# Patient Record
Sex: MALE | Race: WHITE | NOT HISPANIC OR LATINO | Employment: FULL TIME | URBAN - METROPOLITAN AREA
[De-identification: names, ages, dates, MRNs, and addresses within clinical notes are randomized per-mention and may not be internally consistent; named-entity substitution may affect disease eponyms.]

---

## 2017-01-09 NOTE — PROGRESS NOTES
SUBJECTIVE:                                                    Sukhdeep Wolf is a 46 year old male who presents to clinic today for the following health issues:        Back Pain Follow Up-    The patient is here for a four-month follow-up. He is seen every 4 months to provide ongoing documentation for the need for limited activities at work. He has bilateral upper back overuse syndrome findings relating to a development of muscular pain if his hours and lifting limits are not monitored. He has no neuropathic changes. To date his limitations have been relatively well followed and he has minor to moderate discomfort at work and night. He is able to recover with 2 days of rest over the weekend.        Description:   Location of pain:  Center and right under the shoulder blades   Character of pain: dull ache and pulled muscle feeling   Pain radiation: shoulder pain radiates but back pain does not   Since last visit, pain is:  unchanged  New numbness or weakness in legs, not attributed to pain:  no     Intensity: At its worst 5/10    History:   Pain interferes with job: YES, is not working Saturdays so he has 2 full days to heal and rest  Therapies tried without relief: ibuprofen, Tylenol   Therapies tried with relief: heat        Accompanying Signs & Symptoms:  Risk of Fracture:  None  Risk of Cauda Equina:  None  Risk of Infection:  None  Risk of Cancer:  None           Amount of exercise or physical activity: occasionally     Problems taking medications regularly: No    Medication side effects: none    Diet: regular (no restrictions)      Chronic Pain Follow-Up       Type / Location of Pain:  Bilateral upper back myofascial pain due to overuse syndrome as well as rotator cuff pain in his right shoulder from a partial rotator cuff tear. Aggravation for these areas come from his repetitive physical demands at work. Uses Tramadol 50 mg up to 1 daily on work days. Usually much less use.  Analgesia/pain control:        Recent changes:  same      Overall control: Comfortably manageable  Activity level/function:      Daily activities:  Able to do all daily activities    Work:  Pain does not limit any  work  Adverse effects:  No  Adherance    Taking medication as directed?  Yes    Participating in other treatments: yes  Risk Factors:    Sleep:  Good    Mood/anxiety:  controlled    Recent family or social stressors:  none noted    Other aggravating factors: repetitive activities - Of pulling against resistance at work. Repetitive activities.  PHQ-9 SCORE 1/11/2017   Total Score 0     YOANA-7 SCORE 1/11/2017   Total Score 0     Encounter-Level CSA:     There are no encounter-level csa.             Problem list and histories reviewed & adjusted, as indicated.  Additional history:     Problem list, Medication list, Allergies, and Medical/Social/Surgical histories reviewed in EPIC and updated as appropriate.    ROS:  C: NEGATIVE for fever, chills, change in weight  I: NEGATIVE for worrisome rashes, moles or lesions  E: NEGATIVE for vision changes or irritation  E/M: NEGATIVE for ear, mouth and throat problems  R: NEGATIVE for significant cough or SOB  CV: NEGATIVE for chest pain, palpitations or peripheral edema  GI: NEGATIVE for nausea, abdominal pain, heartburn, or change in bowel habits  : NEGATIVE for frequency, dysuria, or hematuria  MUSCULOSKELETAL: As noted above regarding his upper back symptoms and right shoulder rotator cuff tear.  N: NEGATIVE for weakness, dizziness or paresthesias  E: NEGATIVE for temperature intolerance, skin/hair changes  H: NEGATIVE for bleeding problems  P: NEGATIVE for changes in mood or affect    OBJECTIVE:                                                    /78 mmHg  Pulse 78  Temp(Src) 98.4  F (36.9  C) (Temporal)  Resp 16  Ht   Wt 250 lb 6.4 oz (113.581 kg)  Body mass index is 34.21 kg/(m^2).  GENERAL: healthy, alert and no distress  EYES: Eyes grossly normal to inspection, extraocular  movements - intact, and PERRL  HENT: ear canals- normal; TMs- normal; Nose- normal; Mouth- no ulcers, no lesions  NECK: no tenderness, no adenopathy, no asymmetry, no masses, no stiffness; thyroid- normal to palpation  RESP: lungs clear to auscultation - no rales, no rhonchi, no wheezes  CV: regular rates and rhythm, normal S1 S2, no S3 or S4 and no murmur, no click or rub -  ABDOMEN: soft, no tenderness, no  hepatosplenomegaly, no masses, normal bowel sounds  MS: Range of motion of the cervical spine normal. No areas of definite tenderness in the upper back, periscapular region. Has maintained good range of motion of his right shoulder. No edema upper extremity.  SKIN: no suspicious lesions, no rashes  NEURO: strength and tone- normal, sensory exam- grossly normal, mentation- intact, speech- normal, reflexes- symmetric  BACK: no CVA tenderness, no paralumbar tenderness  PSYCH: Alert and oriented times 3; speech- coherent , normal rate and volume; able to articulate logical thoughts, able to abstract reason, no tangential thoughts, no hallucinations or delusions, affect- normal  LYMPHATICS: ant. cervical- normal, post. cervical- normal, axillary- normal, supraclavicular- normal, inguinal- normal    Diagnostic test results:  Diagnostic Test Results:  Urine drug screen obtained. Expect no Tramadol to be present as he has not taken any for the last 2 days. Is not use Flexeril regularly.      ASSESSMENT/PLAN:                                                    1. Chronic pain syndrome   Developing a chronic pain protocol for Tramadol 50 mg, using essentially 50 mg daily on work days if needed. His last prescription for 20 tablets as lasted almost 2 months. Will follow protocol and obtain urine drug screen, controlled substance agreement and 4 month follow-up.  - [id number] - Pain Drug Screen, Urine, with reported meds ([facility])    2. Overuse syndrome of mid back, subsequent encounter  Renew his recommendations for  limitations at work  - [id number] - Pain Drug Screen, Urine, with reported meds ([facility])    3. Incomplete tear of right rotator cuff   Added recommendation to limit the amount of resistance that he needs to pull wires with his right upper extremity to help protect the partial rotator cuff tear.  - [id number] - Pain Drug Screen, Urine, with reported meds ([facility])      Follow up with Provider - Follow-up in 4 months.   See Patient Instructions    The patient understood the rational for the diagnosis and treatment plan. All questions were answered to best of my ability and the patient's satisfaction.    Time spent with the patient 20 miutes with greater than 50 % spent in care coordination and counseling.        Luisito Huang MD  [facility]

## 2017-01-11 ENCOUNTER — OFFICE VISIT (OUTPATIENT)
Dept: FAMILY MEDICINE | Facility: CLINIC | Age: 47
End: 2017-01-11
Payer: COMMERCIAL

## 2017-01-11 VITALS
HEART RATE: 78 BPM | SYSTOLIC BLOOD PRESSURE: 122 MMHG | RESPIRATION RATE: 16 BRPM | TEMPERATURE: 98.4 F | WEIGHT: 250.4 LBS | BODY MASS INDEX: 34.21 KG/M2 | DIASTOLIC BLOOD PRESSURE: 78 MMHG
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
## 2017-01-11 DIAGNOSIS — M75.111 INCOMPLETE TEAR OF RIGHT ROTATOR CUFF: ICD-10-CM

## 2017-01-11 DIAGNOSIS — G89.4 CHRONIC PAIN SYNDROME: Primary | ICD-10-CM

## 2017-01-11 DIAGNOSIS — S29.012D OVERUSE SYNDROME OF MID BACK, SUBSEQUENT ENCOUNTER: ICD-10-CM

## 2017-01-11 PROCEDURE — 80307 DRUG TEST PRSMV CHEM ANLYZR: CPT | Mod: 90 | Performed by: FAMILY MEDICINE

## 2017-01-11 PROCEDURE — 99214 OFFICE O/P EST MOD 30 MIN: CPT | Performed by: FAMILY MEDICINE

## 2017-01-11 PROCEDURE — 99000 SPECIMEN HANDLING OFFICE-LAB: CPT | Performed by: FAMILY MEDICINE

## 2017-01-11 ASSESSMENT — ANXIETY QUESTIONNAIRES
5. BEING SO RESTLESS THAT IT IS HARD TO SIT STILL: NOT AT ALL
1. FEELING NERVOUS, ANXIOUS, OR ON EDGE: NOT AT ALL
6. BECOMING EASILY ANNOYED OR IRRITABLE: NOT AT ALL
GAD7 TOTAL SCORE: 0
2. NOT BEING ABLE TO STOP OR CONTROL WORRYING: NOT AT ALL
7. FEELING AFRAID AS IF SOMETHING AWFUL MIGHT HAPPEN: NOT AT ALL
3. WORRYING TOO MUCH ABOUT DIFFERENT THINGS: NOT AT ALL

## 2017-01-11 ASSESSMENT — PATIENT HEALTH QUESTIONNAIRE - PHQ9: 5. POOR APPETITE OR OVEREATING: NOT AT ALL

## 2017-01-11 ASSESSMENT — PAIN SCALES - GENERAL: PAINLEVEL: MODERATE PAIN (4)

## 2017-01-11 NOTE — NURSING NOTE
"Chief Complaint   Patient presents with     Back Pain     Panel Management     MyChart, Flu Shot       Initial /78 mmHg  Pulse 78  Temp(Src) 98.4  F (36.9  C) (Temporal)  Resp 16  Ht   Wt 250 lb 6.4 oz (113.581 kg) Estimated body mass index is 34.21 kg/(m^2) as calculated from the following:    Height as of 9/14/16: 5' 11.75\" (1.822 m).    Weight as of this encounter: 250 lb 6.4 oz (113.581 kg).  BP completed using cuff size: large    "

## 2017-01-11 NOTE — PATIENT INSTRUCTIONS
Controlled Substances:    You have been prescribed a medication that is a controlled substance.  These medications must be closely monitored under Federal Law.     Controlled substances may cause you to become dependent or addicted to them.  The longer you take them, the more likely it is that you will suffer withdrawal symptoms when you stop the medication. This may also cause you to become tolerant to the medication, which means that it will become less effective.    It is important to notify your doctor of any side effects from the medication you are taking.  Common side effects are constipation, drowsiness, dizziness, itching, and nausea and vomiting.    The medication may impair your thinking.  Do not take it prior to driving or using machinery.  Do not take it in combination with alcohol or other sedating substances.  It is important that you follow the instructions on the prescription bottle.  Do not increase the dosage unless instructed by the clinic.    We do not refill lost, stolen or damaged prescriptions for controlled substances.     We do not refill controlled substance prescriptions after normal clinic hours or on weekends.      These are new changes to your current plan of care based on today's visit:    Medications stopped    Medications to be started    Change dose of this medication No changes indicated   New treatments        Follow up appointments:    1.  FOLLOW UP WITH YOUR PRIMARY CARE PROVIDER: 4 month(s) for general physical with blood work     2. FOLLOW UP WITH SPECIALIST: As planned    Luisito Huang MD

## 2017-01-11 NOTE — Clinical Note
[facility]    01/11/2017    Patient: Sukhdeep Wolf  YOB: 1970  Medical Record Number: [id number]    Controlled Substance Agreement  I understand that my care provider has prescribed controlled substances (narcotics, tranquilizers, and/or stimulants) to help manage my condition(s).  I am taking this medicine to help me function or work.  I know that this is strong medicine.  It could have serious side effects and even cause a dependency on the drug.  If I stop these medicines suddenly, I could have severe withdrawal symptoms.    The risks, benefits, and side effects of these medication(s) were explained to me.  I agree that:  1. I will take part in other treatments as advised by my provider.  This may be psychiatry or counseling, physical therapy, behavioral therapy, group treatment, or a referral to a pain clinic.  I will reduce or stop my medicine when my provider tells me to do so.   2. I will take my medicines as prescribed.  I will not change the dose or schedule unless my provider tells me to.  There will be no refills if I  run out early.   I may be contacted at any time without warning and asked to complete a drug test or pill count.   3. I will keep all my appointments at the clinic.  If I miss appointments or fail to follow instructions, my provider may stop my medicine.  4. I will not ask other providers to prescribe controlled substances. And I will not accept controlled substances from other people. If I need another prescribed controlled substance for a new reason, I will notify my provider within one business day.  5. If I enroll in the [place] Medical Marijuana program, I will tell my provider.  I will also sign an agreement to share my medical records with my provider.  6. I will use one pharmacy to fill all of my controlled substance prescriptions.  If my prescription is mailed to my pharmacy, it may take 5 to 7 days for my medicine to be ready.  7. I understand that  my provider, clinic care team, and pharmacy can track controlled substance prescriptions from other providers through a central database (prescription monitoring program).  8. I will bring in my list of medications (or my medicine bottles) each time I come to the clinic.  Page 1 of 2      [facility]    01/11/2017    Patient: Sukhdeep Wolf  YOB: 1970  Medical Record Number: [id number]    9. Refills of controlled substances will be made only during office hours.  It is up to me to make sure that I do not run out of my medicines on weekends or holidays.    10. I am responsible for my prescriptions.  If the medicine is lost or stolen, it will not be replaced.   I also agree not to share these medicines with anyone.  11. I agree to not use ANY illegal or recreational drugs.  This includes marijuana, cocaine, bath salts or other drugs.  I agree not to use alcohol unless my provider says I may.  I agree to give urine samples whenever asked.  If I fail to give a urine sample, the provider may stop my medicine.     12. I will tell my nurse or provider right away if I become pregnant or have a new medical problem treated outside of [facility].  13. I understand that this medicine can affect my thinking and judgment.  It may be unsafe for me to drive, use machinery and do dangerous tasks.  I will not do any of these things until I know how the medicine affects me.  If my dose changes, I will wait to see how it affects me.  I will contact my provider if I have concerns about medicine side effects.  I understand that if I do not follow any of the conditions above, my prescriptions or treatment may be stopped.    I agree that my provider, clinic care team, and pharmacy may work with any city, state or federal law enforcement agency that investigates the misuse, sale, or other diversion of my controlled medicine. I will allow my provider to discuss my care with or share a copy of this agreement with  any other treating provider, pharmacy or emergency room where I receive care.  I agree to give up (waive) any right of privacy or confidentiality with respect to these authorizations.   I have read this agreement and have asked questions about anything I did not understand.   ___________________________________    ___________________________  Patient Signature                                                             Date and Time  ___________________________________     ____________________________  Witness                                                                              Date and Time  ___________________________________  Luisito Huang MD  Page 2 of 2  Opioid Pain Medicines (also known as Narcotics)  What You Need to Know      What are opioids?   Opioids are pain medicines that must be prescribed by a doctor. Examples are:     morphine (MS Contin, Thao)    oxycodone (Oxycontin)    oxycodone and acetaminophen (Percocet)    hydrocodone and acetaminophen (Vicodin, Norco)     fentanyl patch (Duragesic)     hydromorphone (Dilaudid)     methadone     What do opioids do well?   Opioids are best for short-term pain after a surgery or injury. They also work well for cancer pain. Unlike other pain medicines, they do not cause liver or kidney failure or ulcers. They may help some people with long-lasting (chronic) pain.     What do opioids NOT do well?   Opioids never get rid of pain entirely, and they do not work well for most patients with chronic pain. Opioids do not reduce swelling, one of the causes of pain. They also don t work well for nerve pain.     Side effects  Talk to your doctor before you start or decide to keep taking one of these medicines. Side effects include:    Lowers your breathing rate enough that it could cause death    Death due to taking more than the prescribed dose    Serious lifelong opioid use      Dependence is not the same as addiction. Addiction is when people keep using a  substance that harms their body, their mind or their relations with others. If you have a history of drug or alcohol abuse, taking opioids can cause a relapse.  Over time, opioids don t work as well. Most people will need higher and higher doses. The higher the dose, the more serious the side effects. We don t know the long-term effects of opioids.   People who have used opioids for a long time have a lower quality of life, worse depression, higher levels of pain and more visits to doctors.  Overdose from prescription drugs is the second leading cause of death in the [place] The risk of overdose rises when opioids are taken with other drugs such as:    Medicines used for anxiety and panic attacks (such as lorazepam, alprazolam, clonazepam    Other sedatives    Alcohol    Illegal drugs such as heroin  Never share your opioids with others. Be sure to store opioids in a secure place, locked if possible.Young children can easily swallow them and overdose.     Are there other ways to manage pain?  Ways to help reduce pain:    Exercise every day.    Treat health problems that may be causing pain.    Treat mental health problems like depression and anxiety.     Worse depression symptoms; Less pleasure in things you usually enjoy    Feeling tired or sluggish    Slower thoughts or cloudy thinking    Being more sensitive to pain over time; Pain is harder to control.    Trouble sleeping or restless sleep    Changes in hormone levels (for example, less testosterone).     Changes in sex drive or ability to have sex    Long lasting nausea and constipation    Trouble breathing while asleep; This is worse with lung problems like COPD or sleep apnea.    Unsafe driving    Getting sick more often    Itching    Feeling dizzy    Dry mouth    Sweating    Trouble emptying the bladder (peeing). This is worse if you have an enlarged prostate or get urinary tract infections (UTIs).    What else should I know about opioids?  When someone takes  opioids for too long or too often, they become dependent. This means that if you stop or reduce the medicine too quickly, you will have withdrawal symptoms.          Practice good sleep habits.  Try to go to bed and get up at the same time every day.    Stop smoking.  Tobacco use can make pain worse.    Do things that you enjoy.    Find a way to work through pain without drugs.  Try deep breathing, meditation, visual imagery and aromatherapy.    Ask your doctor to help you create a plan to manage your pain.

## 2017-01-11 NOTE — Clinical Note
2017    To Whom It Concerns:    RE: Work restrictions for  Sukhdeep Wolf  [address]  [place] [address]        Sukhdeep Wolf is under my professional care for Overuse Syndrome of his mid back and Partial Right Rotator Cuff Tear.    He may return to work with the followin. He may continue to work in his current position    2. Recommend limiting his work week to 5 days per week, 8-10 hours per day to avoid injury.     3. Recommend having 2 consecutive days off to recover (Saturday and ), to avoid injury.    4. Recommending limiting the amount of resistance needed involving the right arm due to a partial rotator cuff tear--stable at present.       Sincerely,      Luisito Huang MD

## 2017-01-11 NOTE — MR AVS SNAPSHOT
After Visit Summary   1/11/2017    Sukhdeep Wolf    MRN: [id number]           Patient Information     Date Of Birth          1970        Visit Information        Provider Department      1/11/2017 2:40 PM Luisito Huang MD [facility]        Today's Diagnoses     Chronic pain syndrome    -  1     Overuse syndrome of mid back, subsequent encounter         Incomplete tear of right rotator cuff           Care Instructions      Controlled Substances:    You have been prescribed a medication that is a controlled substance.  These medications must be closely monitored under Federal Law.     Controlled substances may cause you to become dependent or addicted to them.  The longer you take them, the more likely it is that you will suffer withdrawal symptoms when you stop the medication. This may also cause you to become tolerant to the medication, which means that it will become less effective.    It is important to notify your doctor of any side effects from the medication you are taking.  Common side effects are constipation, drowsiness, dizziness, itching, and nausea and vomiting.    The medication may impair your thinking.  Do not take it prior to driving or using machinery.  Do not take it in combination with alcohol or other sedating substances.  It is important that you follow the instructions on the prescription bottle.  Do not increase the dosage unless instructed by the clinic.    We do not refill lost, stolen or damaged prescriptions for controlled substances.     We do not refill controlled substance prescriptions after normal clinic hours or on weekends.      These are new changes to your current plan of care based on today's visit:    Medications stopped    Medications to be started    Change dose of this medication No changes indicated   New treatments        Follow up appointments:    1.  FOLLOW UP WITH YOUR PRIMARY CARE PROVIDER: 4 month(s) for general physical with  "blood work     2. FOLLOW UP WITH SPECIALIST: As planned    Luisito Huang MD              Follow-ups after your visit        Follow-up notes from your care team     Return in about 4 months (around 2017) for Physical Exam, Lab Work.      Who to contact     If you have questions or need follow up information about today's clinic visit or your schedule please contact [facility] at [phone].  Normal or non-critical lab and imaging results will be communicated to you by [employer], letter or phone within 4 business days after the clinic has received the results. If you do not hear from us within 7 days, please contact the clinic through [employer] or phone. If you have a critical or abnormal lab result, we will notify you by phone as soon as possible.  Submit refill requests through [employer] or call your pharmacy and they will forward the refill request to us. Please allow 3 business days for your refill to be completed.          Additional Information About Your Visit        [employer] Information     [employer] lets you send messages to your doctor, view your test results, renew your prescriptions, schedule appointments and more. To sign up, go to [email or web address] . Click on \"Log in\" on the left side of the screen, which will take you to the Welcome page. Then click on \"Sign up Now\" on the right side of the page.     You will be asked to enter the access code listed below, as well as some personal information. Please follow the directions to create your username and password.     Your access code is: [email or web address]  Expires: 2017  3:26 PM     Your access code will  in 90 days. If you need help or a new code, please call your [facility] clinic or [phone].        Care EveryWhere ID     This is your Care EveryWhere ID. This could be used by other organizations to access your [facility] medical records  [id number]        Your Vitals Were     Pulse Temperature Respirations             " 78 98.4  F (36.9  C) (Temporal) 16          Blood Pressure from Last 3 Encounters:   01/11/17 122/78   09/16/16 130/81   09/14/16 124/82    Weight from Last 3 Encounters:   01/11/17 250 lb 6.4 oz (113.581 kg)   09/14/16 245 lb 1.6 oz (111.177 kg)   09/08/16 247 lb (112.038 kg)              We Performed the Following     [id number] - Pain Drug Screen, Urine, with reported meds ([employer])          Today's Medication Changes          These changes are accurate as of: 1/11/17  3:26 PM.  If you have any questions, ask your nurse or doctor.               These medicines have changed or have updated prescriptions.        Dose/Directions    traMADol 50 MG tablet   Commonly known as:  ULTRAM   This may have changed:  Another medication with the same name was removed. Continue taking this medication, and follow the directions you see here.   Used for:  AC (acromioclavicular) joint arthritis   Changed by:  Luisito Huang MD        Dose:  50 mg   Take 1 tablet (50 mg) by mouth every 6 hours as needed for pain   Quantity:  20 tablet   Refills:  0                Primary Care Provider Office Phone # Fax #    Luisito Huang [phone]       [facility] [id number] [place] [id number]        Thank you!     Thank you for choosing [facility]  for your care. Our goal is always to provide you with excellent care. Hearing back from our patients is one way we can continue to improve our services. Please take a few minutes to complete the written survey that you may receive in the mail after your visit with us. Thank you!             Your Updated Medication List - Protect others around you: Learn how to safely use, store and throw away your medicines at www.disposemymeds.org.          This list is accurate as of: 1/11/17  3:26 PM.  Always use your most recent med list.                   Brand Name Dispense Instructions for use    cyclobenzaprine 10 MG tablet    FLEXERIL    30 tablet     Take 1 tablet (10 mg) by mouth every 12 hours as needed for muscle spasms       DAILY MULTIVITAMIN PO      Take 1 tablet by mouth daily.       diclofenac 75 MG EC tablet    VOLTAREN    60 tablet    Take 1 tablet (75 mg) by mouth 2 times daily       Ginkgo Biloba 40 MG Tabs      Take  by mouth.       LYSINE      1 tablet daily.       PARoxetine 10 MG tablet    PAXIL    30 tablet    Use one tablet daily as directed       traMADol 50 MG tablet    ULTRAM    20 tablet    Take 1 tablet (50 mg) by mouth every 6 hours as needed for pain

## 2017-01-11 NOTE — Clinical Note
[facility]    01/11/2017    Patient: Sukhdeep Wolf  YOB: 1970  Medical Record Number: [id number]    Controlled Substance Agreement  I understand that my care provider has prescribed controlled substances (narcotics, tranquilizers, and/or stimulants) to help manage my condition(s).  I am taking this medicine to help me function or work.  I know that this is strong medicine.  It could have serious side effects and even cause a dependency on the drug.  If I stop these medicines suddenly, I could have severe withdrawal symptoms.    The risks, benefits, and side effects of these medication(s) were explained to me.  I agree that:  1. I will take part in other treatments as advised by my provider.  This may be psychiatry or counseling, physical therapy, behavioral therapy, group treatment, or a referral to a pain clinic.  I will reduce or stop my medicine when my provider tells me to do so.   2. I will take my medicines as prescribed.  I will not change the dose or schedule unless my provider tells me to.  There will be no refills if I  run out early.   I may be contacted at any time without warning and asked to complete a drug test or pill count.   3. I will keep all my appointments at the clinic.  If I miss appointments or fail to follow instructions, my provider may stop my medicine.  4. I will not ask other providers to prescribe controlled substances. And I will not accept controlled substances from other people. If I need another prescribed controlled substance for a new reason, I will notify my provider within one business day.  5. If I enroll in the [place] Medical Marijuana program, I will tell my provider.  I will also sign an agreement to share my medical records with my provider.  6. I will use one pharmacy to fill all of my controlled substance prescriptions.  If my prescription is mailed to my pharmacy, it may take 5 to 7 days for my medicine to be ready.  7. I understand that  my provider, clinic care team, and pharmacy can track controlled substance prescriptions from other providers through a central database (prescription monitoring program).  8. I will bring in my list of medications (or my medicine bottles) each time I come to the clinic.  Page 1 of 2      [facility]    01/11/2017    Patient: Sukhdeep Wolf  YOB: 1970  Medical Record Number: [id number]    9. Refills of controlled substances will be made only during office hours.  It is up to me to make sure that I do not run out of my medicines on weekends or holidays.    10. I am responsible for my prescriptions.  If the medicine is lost or stolen, it will not be replaced.   I also agree not to share these medicines with anyone.  11. I agree to not use ANY illegal or recreational drugs.  This includes marijuana, cocaine, bath salts or other drugs.  I agree not to use alcohol unless my provider says I may.  I agree to give urine samples whenever asked.  If I fail to give a urine sample, the provider may stop my medicine.     12. I will tell my nurse or provider right away if I become pregnant or have a new medical problem treated outside of [facility].  13. I understand that this medicine can affect my thinking and judgment.  It may be unsafe for me to drive, use machinery and do dangerous tasks.  I will not do any of these things until I know how the medicine affects me.  If my dose changes, I will wait to see how it affects me.  I will contact my provider if I have concerns about medicine side effects.  I understand that if I do not follow any of the conditions above, my prescriptions or treatment may be stopped.    I agree that my provider, clinic care team, and pharmacy may work with any city, state or federal law enforcement agency that investigates the misuse, sale, or other diversion of my controlled medicine. I will allow my provider to discuss my care with or share a copy of this agreement with  any other treating provider, pharmacy or emergency room where I receive care.  I agree to give up (waive) any right of privacy or confidentiality with respect to these authorizations.   I have read this agreement and have asked questions about anything I did not understand.   ___________________________________    ___________________________  Patient Signature                                                             Date and Time  ___________________________________     ____________________________  Witness                                                                              Date and Time  ___________________________________  Luisito Huang MD  Page 2 of 2  Opioid Pain Medicines (also known as Narcotics)  What You Need to Know      What are opioids?   Opioids are pain medicines that must be prescribed by a doctor. Examples are:     morphine (MS Contin, Thao)    oxycodone (Oxycontin)    oxycodone and acetaminophen (Percocet)    hydrocodone and acetaminophen (Vicodin, Norco)     fentanyl patch (Duragesic)     hydromorphone (Dilaudid)     methadone     What do opioids do well?   Opioids are best for short-term pain after a surgery or injury. They also work well for cancer pain. Unlike other pain medicines, they do not cause liver or kidney failure or ulcers. They may help some people with long-lasting (chronic) pain.     What do opioids NOT do well?   Opioids never get rid of pain entirely, and they do not work well for most patients with chronic pain. Opioids do not reduce swelling, one of the causes of pain. They also don t work well for nerve pain.     Side effects  Talk to your doctor before you start or decide to keep taking one of these medicines. Side effects include:    Lowers your breathing rate enough that it could cause death    Death due to taking more than the prescribed dose    Serious lifelong opioid use      Dependence is not the same as addiction. Addiction is when people keep using a  substance that harms their body, their mind or their relations with others. If you have a history of drug or alcohol abuse, taking opioids can cause a relapse.  Over time, opioids don t work as well. Most people will need higher and higher doses. The higher the dose, the more serious the side effects. We don t know the long-term effects of opioids.   People who have used opioids for a long time have a lower quality of life, worse depression, higher levels of pain and more visits to doctors.  Overdose from prescription drugs is the second leading cause of death in the [place] The risk of overdose rises when opioids are taken with other drugs such as:    Medicines used for anxiety and panic attacks (such as lorazepam, alprazolam, clonazepam    Other sedatives    Alcohol    Illegal drugs such as heroin  Never share your opioids with others. Be sure to store opioids in a secure place, locked if possible.Young children can easily swallow them and overdose.     Are there other ways to manage pain?  Ways to help reduce pain:    Exercise every day.    Treat health problems that may be causing pain.    Treat mental health problems like depression and anxiety.     Worse depression symptoms; Less pleasure in things you usually enjoy    Feeling tired or sluggish    Slower thoughts or cloudy thinking    Being more sensitive to pain over time; Pain is harder to control.    Trouble sleeping or restless sleep    Changes in hormone levels (for example, less testosterone).     Changes in sex drive or ability to have sex    Long lasting nausea and constipation    Trouble breathing while asleep; This is worse with lung problems like COPD or sleep apnea.    Unsafe driving    Getting sick more often    Itching    Feeling dizzy    Dry mouth    Sweating    Trouble emptying the bladder (peeing). This is worse if you have an enlarged prostate or get urinary tract infections (UTIs).    What else should I know about opioids?  When someone takes  opioids for too long or too often, they become dependent. This means that if you stop or reduce the medicine too quickly, you will have withdrawal symptoms.          Practice good sleep habits.  Try to go to bed and get up at the same time every day.    Stop smoking.  Tobacco use can make pain worse.    Do things that you enjoy.    Find a way to work through pain without drugs.  Try deep breathing, meditation, visual imagery and aromatherapy.    Ask your doctor to help you create a plan to manage your pain.

## 2017-01-12 ASSESSMENT — ANXIETY QUESTIONNAIRES: GAD7 TOTAL SCORE: 0

## 2017-01-12 ASSESSMENT — PATIENT HEALTH QUESTIONNAIRE - PHQ9: SUM OF ALL RESPONSES TO PHQ QUESTIONS 1-9: 0

## 2017-01-15 LAB — PAIN DRUG SCR UR W RPTD MEDS: NORMAL

## 2017-01-23 ENCOUNTER — TELEPHONE (OUTPATIENT)
Dept: FAMILY MEDICINE | Facility: CLINIC | Age: 47
End: 2017-01-23

## 2017-01-23 DIAGNOSIS — M19.019 AC (ACROMIOCLAVICULAR) JOINT ARTHRITIS: Primary | ICD-10-CM

## 2017-01-23 RX ORDER — TRAMADOL HYDROCHLORIDE 50 MG/1
50 TABLET ORAL EVERY 6 HOURS PRN
Qty: 20 TABLET | Refills: 0 | Status: SHIPPED | OUTPATIENT
Start: 2017-01-23 | End: 2017-01-23

## 2017-01-23 RX ORDER — TRAMADOL HYDROCHLORIDE 50 MG/1
50 TABLET ORAL EVERY 6 HOURS PRN
Qty: 80 TABLET | Refills: 0 | Status: SHIPPED | OUTPATIENT
Start: 2017-01-23 | End: 2017-06-07

## 2017-01-23 NOTE — TELEPHONE ENCOUNTER
Called and explained the situation and that a new prescription was sent for 80 tablets. He will call the pharmacy for advice on how to handle the situation.    Luisito Huang MD

## 2017-01-23 NOTE — TELEPHONE ENCOUNTER
Can be informed that a second prescription was sent for 80 tablets. This is expected to last 4 months.    Luisito Huang MD  Please close encounter when call/work completed.

## 2017-01-23 NOTE — TELEPHONE ENCOUNTER
traMADol (ULTRAM) 50 MG tablet      Last Written Prescription Date:  11/1/2016  Last Fill Quantity: 20,   # refills: 0  Last Office Visit with [facility], P or  Health prescribing provider: 1/11/2017  Future Office visit:       Routing refill request to provider for review/approval because:  Drug not on the [facility], [facility] or [facility] refill protocol or controlled substance

## 2017-01-23 NOTE — TELEPHONE ENCOUNTER
[facility] phone call message- patient requests medication or medication refill:    If this is a refill request, has the caller requested the refill from the pharmacy already? No  Name of the pharmacy and phone number for the current request:  [facility] [phone]    Name of the medication requested: traMADol (ULTRAM) 50 MG tablet    Other request: n/a    OK to leave the result message on voice mail or with a family member? YES    Call taken on 1/23/2017 at 10:30 AM by Aide Oh

## 2017-05-09 NOTE — PROGRESS NOTES
SUBJECTIVE:                                                    Sukhdeep Wolf is a 46 year old male who presents to clinic today for the following health issues:      Back Pain Follow Up--    This patient is to chronically recurring musculoskeletal symptoms for which he receives occasional doses of tramadol and requires long-standing restrictions at work. He is required to be seen every 4 months due to tramadol use and also to reissue work restrictions.    He has recurring pain in the right periscapular region from an overuse process at work. He also has an apparent partial rotator cuff tear in his right shoulder which causes intermittent pain and also requires some restrictions at work.    The restrictions have been accepted by his employer and human resources.        Description:   Location of pain:  Upper and mid back by shoulders.   Character of pain: sharp and dull ache  Pain radiation: Does not radiate and occasionally up into the neck and arms  Since last visit, pain is:  Somewhat better, Easier to tolerate  New numbness or weakness in legs, not attributed to pain:  no     Intensity: Currently 2/10- took Tramadol this morning and a Flexeril. Without meds a 3-5/10, usually not too bad. Last couple days he has pushed the pain a little more than he should.     History:   Pain interferes with job: No,   Therapies tried without relief: Tramadol and Flexeril  Therapies tried with relief: Tramadol and Flexeril. Cream similar to an icy hot. Uses weekends only.         Accompanying Signs & Symptoms:  Risk of Fracture:  None  Risk of Cauda Equina:  None  Risk of Infection:  None  Risk of Cancer:  None           Amount of exercise or physical activity: Physical activity at work    Problems taking medications regularly: No    Medication side effects: none    Diet: regular (no restrictions)        Chronic Pain Follow-Up       Type / Location of Pain: Uses occasional doses of Tramadol for right medial periscapular pain  and rotator cuff pain. Not used on a daily basis.  Analgesia/pain control:       Recent changes:  same      Overall control: Comfortably manageable  Activity level/function:      Daily activities:  Able to do all daily activities    Work:  Pain does not limit any  work  Adverse effects:  No  Adherence    Taking medication as directed?  Yes    Participating in other treatments: not applicable  Risk Factors:    Sleep:  Good    Mood/anxiety:  controlled    Recent family or social stressors:  none noted    Other aggravating factors: Certain repetitive activities at work and long hours at work, all addressed in his restrictions.  PHQ-9 SCORE 1/11/2017   Total Score 0     YOANA-7 SCORE 1/11/2017   Total Score 0     Encounter-Level CSA - 01/11/2017:                 Controlled Substance Agreement - Scan on 1/24/2017 12:00 PM : CONTROLLED SUBSTANCE AGREEMENT (below)                 Problem list and histories reviewed & adjusted, as indicated.  Additional history: as documented        Reviewed and updated as needed this visit by clinical staff  Tobacco  Allergies  Med Hx  Surg Hx  Fam Hx  Soc Hx      Reviewed and updated as needed this visit by Provider         ROS:  C: NEGATIVE for fever, chills, change in weight  I: NEGATIVE for worrisome rashes, moles or lesions  E: NEGATIVE for vision changes or irritation  E/M: NEGATIVE for ear, mouth and throat problems  R: NEGATIVE for significant cough or SOB  CV: NEGATIVE for chest pain, palpitations or peripheral edema  GI: NEGATIVE for nausea, abdominal pain, heartburn, or change in bowel habits  : NEGATIVE for frequency, dysuria, or hematuria  MUSCULOSKELETAL: As noted above  N: NEGATIVE for weakness, dizziness or paresthesias  E: NEGATIVE for temperature intolerance, skin/hair changes  H: NEGATIVE for bleeding problems  P: NEGATIVE for changes in mood or affect    OBJECTIVE:                                                    /80  Pulse 64  Temp 97.1  F (36.2  C)  "(Temporal)  Resp 16  Ht 6' 0.24\" (1.835 m)  Wt 249 lb 3.2 oz (113 kg)  BMI 33.57 kg/m2  Body mass index is 33.57 kg/(m^2).  GENERAL: healthy, alert and no distress  EYES: Eyes grossly normal to inspection, extraocular movements - intact, and PERRL  HENT: ear canals- normal; TMs- normal; Nose- normal; Mouth- no ulcers, no lesions  NECK: no tenderness, no adenopathy, no asymmetry, no masses, no stiffness; thyroid- normal to palpation  RESP: lungs clear to auscultation - no rales, no rhonchi, no wheezes  CV: regular rates and rhythm, normal S1 S2, no S3 or S4 and no murmur, no click or rub -  ABDOMEN: soft, no tenderness, no  hepatosplenomegaly, no masses, normal bowel sounds  MS: No current tenderness along the right periscapular region. Currently is full range of motion of his right shoulder. CMS and neurologically intact.  SKIN: no suspicious lesions, no rashes  NEURO: strength and tone- normal, sensory exam- grossly normal, mentation- intact, speech- normal, reflexes- symmetric  BACK: no CVA tenderness, no paralumbar tenderness  PSYCH: Alert and oriented times 3; speech- coherent , normal rate and volume; able to articulate logical thoughts, able to abstract reason, no tangential thoughts, no hallucinations or delusions, affect- normal  LYMPHATICS: ant. cervical- normal, post. cervical- normal, axillary- normal, supraclavicular- normal, inguinal- normal    Diagnostic test results:  Diagnostic Test Results:  none      ASSESSMENT/PLAN:                                                    1. Overuse syndrome of mid back, subsequent encounter   Stable with current restrictions.    2. Incomplete tear of right rotator cuff   Tolerable with current anti-inflammatory agents and occasional use of tramadol    3. Chronic pain syndrome    Stable with occasional use of tramadol 50 mg.      Follow up with Provider - Follow-up in 4 months.   See Patient Instructions    The patient understood the rational for the diagnosis and " treatment plan. All questions were answered to best of my ability and the patient's satisfaction.      Luisito Huang MD  [facility]

## 2017-05-12 ENCOUNTER — OFFICE VISIT (OUTPATIENT)
Dept: FAMILY MEDICINE | Facility: CLINIC | Age: 47
End: 2017-05-12
Payer: COMMERCIAL

## 2017-05-12 VITALS
TEMPERATURE: 97.1 F | HEART RATE: 64 BPM | SYSTOLIC BLOOD PRESSURE: 130 MMHG | HEIGHT: 72 IN | WEIGHT: 249.2 LBS | RESPIRATION RATE: 16 BRPM | DIASTOLIC BLOOD PRESSURE: 80 MMHG | BODY MASS INDEX: 33.75 KG/M2

## 2017-05-12 DIAGNOSIS — S29.012D OVERUSE SYNDROME OF MID BACK, SUBSEQUENT ENCOUNTER: Primary | ICD-10-CM

## 2017-05-12 DIAGNOSIS — G89.4 CHRONIC PAIN SYNDROME: ICD-10-CM

## 2017-05-12 DIAGNOSIS — M75.111 INCOMPLETE TEAR OF RIGHT ROTATOR CUFF: ICD-10-CM
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
## 2017-05-12 PROCEDURE — 99214 OFFICE O/P EST MOD 30 MIN: CPT | Performed by: FAMILY MEDICINE

## 2017-05-12 ASSESSMENT — PAIN SCALES - GENERAL: PAINLEVEL: MILD PAIN (2)

## 2017-05-12 NOTE — NURSING NOTE
"Chief Complaint   Patient presents with     Back Pain     Panel Management     UTD        Initial /80  Pulse 64  Temp 97.1  F (36.2  C) (Temporal)  Resp 16  Ht 6' 0.24\" (1.835 m)  Wt 249 lb 3.2 oz (113 kg)  BMI 33.57 kg/m2 Estimated body mass index is 33.57 kg/(m^2) as calculated from the following:    Height as of this encounter: 6' 0.24\" (1.835 m).    Weight as of this encounter: 249 lb 3.2 oz (113 kg).  Medication Reconciliation: complete     Erica Madrid CMA  "

## 2017-05-12 NOTE — PATIENT INSTRUCTIONS
These are new changes to your current plan of care based on today's visit:    Medications stopped    Medications to be started    Change dose of this medication    New treatments        Follow up appointments:    1.  FOLLOW UP WITH YOUR PRIMARY CARE PROVIDER: 4 month(s) for clinic visit    Luisito Huang MD

## 2017-05-12 NOTE — LETTER
May 12, 2017      To Whom It Concerns:     RE: Work restrictions for  Sukhdeep Wolf  [address]  [facility] [address]           Sukhdeep Wolf is under my professional care for Overuse Syndrome of his mid back and Partial Right Rotator Cuff Tear.     He may return to work with the followin. He may continue to work in his current position     2. Recommend limiting his work week to 5 days per week, 8-10 hours per day to avoid injury.      3. Recommend having 2 consecutive days off to recover (Saturday and ), to avoid injury.     4. Recommending limiting the amount of resistance needed involving the right arm due to a partial rotator cuff tear--stable at present.           Luisito Huang MD

## 2017-05-12 NOTE — MR AVS SNAPSHOT
"              After Visit Summary   5/12/2017    Sukhdeep Wolf    MRN: [id number]           Patient Information     Date Of Birth          1970        Visit Information        Provider Department      5/12/2017 11:20 AM Luisito Huang MD [facility]        Today's Diagnoses     Overuse syndrome of mid back, subsequent encounter    -  1    Incomplete tear of right rotator cuff        Chronic pain syndrome          Care Instructions    These are new changes to your current plan of care based on today's visit:    Medications stopped    Medications to be started    Change dose of this medication    New treatments        Follow up appointments:    1.  FOLLOW UP WITH YOUR PRIMARY CARE PROVIDER: 4 month(s) for clinic visit    Luisito Huang MD              Follow-ups after your visit        Who to contact     If you have questions or need follow up information about today's clinic visit or your schedule please contact [facility] directly at [phone].  Normal or non-critical lab and imaging results will be communicated to you by MyChart, letter or phone within 4 business days after the clinic has received the results. If you do not hear from us within 7 days, please contact the clinic through [employer] or phone. If you have a critical or abnormal lab result, we will notify you by phone as soon as possible.  Submit refill requests through [employer] or call your pharmacy and they will forward the refill request to us. Please allow 3 business days for your refill to be completed.          Additional Information About Your Visit        MyChart Information     [employer] lets you send messages to your doctor, view your test results, renew your prescriptions, schedule appointments and more. To sign up, go to [email or web address] . Click on \"Log in\" on the left side of the screen, which will take you to the Welcome page. Then click on \"Sign up Now\" on the right side of the page.     You will " "be asked to enter the access code listed below, as well as some personal information. Please follow the directions to create your username and password.     Your access code is: [email or web address]  Expires: 8/10/2017 12:00 PM     Your access code will  in 90 days. If you need help or a new code, please call your [place] clinic or [phone].        Care EveryWhere ID     This is your Care EveryWhere ID. This could be used by other organizations to access your [place] medical records  [id number]        Your Vitals Were     Pulse Temperature Respirations Height BMI (Body Mass Index)       64 97.1  F (36.2  C) (Temporal) 16 6' 0.24\" (1.835 m) 33.57 kg/m2        Blood Pressure from Last 3 Encounters:   17 130/80   17 122/78   16 130/81    Weight from Last 3 Encounters:   17 249 lb 3.2 oz (113 kg)   17 250 lb 6.4 oz (113.6 kg)   16 245 lb 1.6 oz (111.2 kg)              Today, you had the following     No orders found for display         Today's Medication Changes          These changes are accurate as of: 17 12:00 PM.  If you have any questions, ask your nurse or doctor.               Stop taking these medicines if you haven't already. Please contact your care team if you have questions.     PARoxetine 10 MG tablet   Commonly known as:  PAXIL   Stopped by:  Luisito Huang MD                    Primary Care Provider Office Phone # Fax #    Luisito Huang [phone]       [facility] [address] [address] [address]        Thank you!     Thank you for choosing [facility]  for your care. Our goal is always to provide you with excellent care. Hearing back from our patients is one way we can continue to improve our services. Please take a few minutes to complete the written survey that you may receive in the mail after your visit with us. Thank you!             Your Updated Medication List - Protect others around you: " Learn how to safely use, store and throw away your medicines at www.disposemymeds.org.          This list is accurate as of: 5/12/17 12:00 PM.  Always use your most recent med list.                   Brand Name Dispense Instructions for use    cyclobenzaprine 10 MG tablet    FLEXERIL    30 tablet    Take 1 tablet (10 mg) by mouth every 12 hours as needed for muscle spasms       DAILY MULTIVITAMIN PO      Take 1 tablet by mouth daily Reported on 5/12/2017       diclofenac 75 MG EC tablet    VOLTAREN    60 tablet    Take 1 tablet (75 mg) by mouth 2 times daily       Ginkgo Biloba 40 MG Tabs      Take  by mouth.       LYSINE      1 tablet daily.       traMADol 50 MG tablet    ULTRAM    80 tablet    Take 1 tablet (50 mg) by mouth every 6 hours as needed for pain Expected to last 4 months.

## 2017-06-07 ENCOUNTER — TELEPHONE (OUTPATIENT)
Dept: FAMILY MEDICINE | Facility: CLINIC | Age: 47
End: 2017-06-07

## 2017-06-07 DIAGNOSIS — M19.019 AC (ACROMIOCLAVICULAR) JOINT ARTHRITIS: ICD-10-CM

## 2017-06-07 RX ORDER — TRAMADOL HYDROCHLORIDE 50 MG/1
50 TABLET ORAL EVERY 6 HOURS PRN
Qty: 80 TABLET | Refills: 0 | Status: SHIPPED | OUTPATIENT
Start: 2017-06-07 | End: 2017-09-20

## 2017-06-07 NOTE — TELEPHONE ENCOUNTER
traMADol (ULTRAM) 50 MG tablet      Last Written Prescription Date:  1/23/2017  Last Fill Quantity: 80,   # refills: 0  Last Office Visit with [facility] or  Health prescribing provider: 5/12/2017  Future Office visit:       Routing refill request to provider for review/approval because:  Drug not on the [facility], [facility] or  Health refill protocol or controlled substance

## 2017-06-07 NOTE — TELEPHONE ENCOUNTER
Reason for call:  Medication  Reason for Call:  Medication or medication refill:    Do you use a [facility]?  Name of the pharmacy and phone number for the current request:  [facility] - [phone]    Name of the medication requested: tramadol    Other request:     Can we leave a detailed message on this number? YES    Phone number patient can be reached at: Home number on file [phone] (home)    Best Time: any    Call taken on 6/7/2017 at 2:54 PM by Julia Merritt

## 2017-09-12 ENCOUNTER — TELEPHONE (OUTPATIENT)
Dept: FAMILY MEDICINE | Facility: CLINIC | Age: 47
End: 2017-09-12

## 2017-09-12 DIAGNOSIS — S29.012D OVERUSE SYNDROME OF MID BACK, SUBSEQUENT ENCOUNTER: ICD-10-CM
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
## 2017-09-12 RX ORDER — CYCLOBENZAPRINE HCL 10 MG
10 TABLET ORAL EVERY 12 HOURS PRN
Qty: 30 TABLET | Refills: 2 | Status: SHIPPED | OUTPATIENT
Start: 2017-09-12 | End: 2018-09-28

## 2017-09-12 NOTE — TELEPHONE ENCOUNTER
cyclobenzaprine (FLEXERIL) 10 MG tablet            Last Written Prescription Date:  7/21/15  Last Fill Quantity: 7/2/15,   # refills: 2  Last Office Visit with [facility] or [facility] prescribing provider: 5/12/17  Future Office visit:    Next 5 appointments (look out 90 days)     Sep 20, 2017  2:20 PM CDT   Office Visit with Luisito Huang MD   [facility]    [address], [address]  [place] [address]   [phone]                   Routing refill request to provider for review/approval because:  Drug not on the [facility] or [facility] refill protocol or controlled substance

## 2017-09-12 NOTE — TELEPHONE ENCOUNTER
Reason for Call:  Medication or medication refill:    Do you use a [facility]?  Name of the pharmacy and phone number for the current request:  [facility] - [phone]    Name of the medication requested: cyclobenzaprine (FLEXERIL) 10 MG tablet    Other request: patient is leaving for out of and is looking to have filled before tomorrow mid-moarning.     Can we leave a detailed message on this number? YES    Phone number patient can be reached at: Home number on file [phone] (home)    Best Time: any    Call taken on 9/12/2017 at 3:21 PM by Melissa Garcia

## 2017-09-14 DIAGNOSIS — M19.019 AC (ACROMIOCLAVICULAR) JOINT ARTHRITIS: ICD-10-CM

## 2017-09-14 DIAGNOSIS — M75.111 INCOMPLETE TEAR OF RIGHT ROTATOR CUFF: ICD-10-CM

## 2017-09-14 RX ORDER — DICLOFENAC SODIUM 75 MG/1
75 TABLET, DELAYED RELEASE ORAL 2 TIMES DAILY
Qty: 60 TABLET | Refills: 11 | Status: SHIPPED | OUTPATIENT
Start: 2017-09-14 | End: 2018-09-28

## 2017-09-14 NOTE — TELEPHONE ENCOUNTER
Routing refill request to provider for review/approval because:  Labs not current:  Liver panel, creatinine    Gretel Samayoa, RN, BSN

## 2017-09-14 NOTE — TELEPHONE ENCOUNTER
diclofenac (VOLTAREN) 75 MG EC tablet      Last Written Prescription Date: 9/8/2016  Last Quantity: 60, # refills: 11  Last Office Visit with [facility] or [facility] prescribing provider: 5/12/2017  Next 5 appointments (look out 90 days)     Sep 20, 2017  2:20 PM CDT   [facility]    [address], [address]  [place] [id number] [phone]                   Creatinine   Date Value Ref Range Status   03/11/2016 1.20 0.66 - 1.25 mg/dL Final     No results found for: AST  No results found for: ALT  BP Readings from Last 3 Encounters:   05/12/17 130/80   01/11/17 122/78   09/16/16 130/81

## 2017-09-15 NOTE — PROGRESS NOTES
SUBJECTIVE:   Sukhdeep Wolf is a 47 year old male who presents to clinic today for the following health issues:    Refill tramadol -    Chronic Pain Follow-Up--    Currently stable. Patient has a four-month follow-up to reassess the limitations he has at work for limiting hours and functions. Currently his work is compliant with suggestions he is doing well. Uses tramadol possibly about 4 times a week. Also relaxation occasionally utilize. No acute changes or long-term changes noted.       Type / Location of Pain: right shoulder and low back follow up   Analgesia/pain control:       Recent changes:  same      Overall control: Tolerable with discomfort  Activity level/function:      Daily activities:  Light to moderate house work, depends     Work:  Full time with restriction   Adverse effects:  No  Adherence    Taking medication as directed?  Yes    Participating in other treatments: occasionally stretching   Risk Factors:    Sleep:  Fair     Mood/anxiety:  controlled    Recent family or social stressors:  none noted    Other aggravating factors: lifting too much, pushing too hard, sleep on shoulder,   PHQ-9 SCORE 1/11/2017   Total Score 0     YOANA-7 SCORE 1/11/2017   Total Score 0     Encounter-Level CSA - 01/11/2017:          Controlled Substance Agreement - Scan on 1/24/2017 12:00 PM : CONTROLLED SUBSTANCE AGREEMENT (below)                  Amount of exercise or physical activity: occasionally     Problems taking medications regularly: No    Medication side effects: none  Diet: regular (no restrictions)          Problem list and histories reviewed & adjusted, as indicated.  Additional history: as documented        Reviewed and updated as needed this visit by clinical staff     Reviewed and updated as needed this visit by Provider         ROS:  C: NEGATIVE for fever, chills, change in weight  I: NEGATIVE for worrisome rashes, moles or lesions  E: NEGATIVE for vision changes or irritation  E/M: NEGATIVE for ear,  mouth and throat problems  R: NEGATIVE for significant cough or SOB  B: NEGATIVE for masses, tenderness or discharge  CV: NEGATIVE for chest pain, palpitations or peripheral edema  GI: NEGATIVE for nausea, abdominal pain, heartburn, or change in bowel habits  : NEGATIVE for frequency, dysuria, or hematuria  MUSCULOSKELETAL: Stable as noted above  N: NEGATIVE for weakness, dizziness or paresthesias  E: NEGATIVE for temperature intolerance, skin/hair changes  H: NEGATIVE for bleeding problems  P: NEGATIVE for changes in mood or affect    OBJECTIVE:                                                    /80  Pulse 68  Temp 97.9  F (36.6  C) (Temporal)  Resp 12  Ht 6' (1.829 m)  Wt 246 lb (111.6 kg)  BMI 33.36 kg/m2  Body mass index is 33.36 kg/(m^2).  GENERAL: healthy, alert and no distress  EYES: Eyes grossly normal to inspection, extraocular movements - intact, and PERRL  HENT: ear canals- normal; TMs- normal; Nose- normal; Mouth- no ulcers, no lesions  NECK: no tenderness, no adenopathy, no asymmetry, no masses, no stiffness; thyroid- normal to palpation  RESP: lungs clear to auscultation - no rales, no rhonchi, no wheezes  CV: regular rates and rhythm, normal S1 S2, no S3 or S4 and no murmur, no click or rub -  ABDOMEN: soft, no tenderness, no  hepatosplenomegaly, no masses, normal bowel sounds  MS: Normal range of motion of the cervical spine. No tenderness of the mid to upper back and the paravertebral muscle groups. Adequate range of motion of the right shoulder. No gross deformities.  SKIN: no suspicious lesions, no rashes  NEURO: strength and tone- normal, sensory exam- grossly normal, mentation- intact, speech- normal, reflexes- symmetric  BACK: no CVA tenderness, no paralumbar tenderness  PSYCH: Alert and oriented times 3; speech- coherent , normal rate and volume; able to articulate logical thoughts, able to abstract reason, no tangential thoughts, no hallucinations or delusions, affect-  normal  LYMPHATICS: ant. cervical- normal, post. cervical- normal, axillary- normal, supraclavicular- normal, inguinal- normal    Diagnostic test results:  Diagnostic Test Results:  none        ASSESSMENT/PLAN:                                                    1. Overuse syndrome of mid back, subsequent encounter   Controlled with limitations at work. Occasional use of Tramadol    2. Chronic pain syndrome  Occasional use of tramadol but not escalating.    3. AC (acromioclavicular) joint arthritis  Stable with limitations on current functions. Patient does limit use of his arm above his head and with limiting abduction of the right shoulder.  - tramadol (ULTRAM) 50 MG tablet; Take 1 tablet (50 mg) by mouth every 6 hours as needed for pain Expected to last 4 months.  Dispense: 80 tablet; Refill: 0      Follow up with Provider - Follow-up in 4 months. At that time would do fasting blood studies and a urine drug screen. No labs today.   See Patient Instructions    The patient understood the rational for the diagnosis and treatment plan. All questions were answered to best of my ability and the patient's satisfaction.    Note: Chart documentation done in part with Dragon Voice Recognition software. Although reviewed after completion, some word and grammatical errors may remain.  Please consider this when interpreting information in this chart.      Luisito Huang MD  [facility]

## 2017-09-20 ENCOUNTER — OFFICE VISIT (OUTPATIENT)
Dept: FAMILY MEDICINE | Facility: CLINIC | Age: 47
End: 2017-09-20
Payer: COMMERCIAL

## 2017-09-20 VITALS
HEIGHT: 72 IN | DIASTOLIC BLOOD PRESSURE: 80 MMHG | TEMPERATURE: 97.9 F | RESPIRATION RATE: 12 BRPM | WEIGHT: 246 LBS | BODY MASS INDEX: 33.32 KG/M2 | SYSTOLIC BLOOD PRESSURE: 112 MMHG | HEART RATE: 68 BPM
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
## 2017-09-20 DIAGNOSIS — G89.4 CHRONIC PAIN SYNDROME: Primary | ICD-10-CM

## 2017-09-20 DIAGNOSIS — M19.019 AC (ACROMIOCLAVICULAR) JOINT ARTHRITIS: ICD-10-CM

## 2017-09-20 DIAGNOSIS — S29.012D OVERUSE SYNDROME OF MID BACK, SUBSEQUENT ENCOUNTER: ICD-10-CM

## 2017-09-20 PROCEDURE — 99214 OFFICE O/P EST MOD 30 MIN: CPT | Performed by: FAMILY MEDICINE

## 2017-09-20 RX ORDER — TRAMADOL HYDROCHLORIDE 50 MG/1
50 TABLET ORAL EVERY 6 HOURS PRN
Qty: 80 TABLET | Refills: 0 | Status: SHIPPED | OUTPATIENT
Start: 2017-09-20 | End: 2017-12-04

## 2017-09-20 RX ORDER — TRAMADOL HYDROCHLORIDE 50 MG/1
50 TABLET ORAL EVERY 6 HOURS PRN
Qty: 80 TABLET | Refills: 0 | Status: SHIPPED | OUTPATIENT
Start: 2017-09-20 | End: 2017-09-20

## 2017-09-20 ASSESSMENT — PAIN SCALES - GENERAL: PAINLEVEL: MILD PAIN (2)

## 2017-09-20 NOTE — PATIENT INSTRUCTIONS
These are new changes to your current plan of care based on today's visit:    Medications stopped    Medications to be started    Change dose of this medication    New treatments        Follow up appointments:    1.  FOLLOW UP WITH YOUR PRIMARY CARE PROVIDER: 4 month(s) for clinic visit with fasting blood work and urine studies    Luisito Huang MD

## 2017-09-20 NOTE — MR AVS SNAPSHOT
"              After Visit Summary   9/20/2017    Sukhdeep Wolf    MRN: [id number]           Patient Information     Date Of Birth          1970        Visit Information        Provider Department      9/20/2017 2:20 PM [facility]        Today's Diagnoses     Chronic pain syndrome    -  1    Overuse syndrome of mid back, subsequent encounter        AC (acromioclavicular) joint arthritis          Care Instructions    These are new changes to your current plan of care based on today's visit:    Medications stopped    Medications to be started    Change dose of this medication    New treatments        Follow up appointments:    1.  FOLLOW UP WITH YOUR PRIMARY CARE PROVIDER: 4 month(s) for clinic visit with fasting blood work and urine studies    Luisito Huang MD                Follow-ups after your visit        Who to contact     If you have questions or need follow up information about today's clinic visit or your schedule please [facility] at [phone].  Normal or non-critical lab and imaging results will be communicated to you by MyChart, letter or phone within 4 business days after the clinic has received the results. If you do not hear from us within 7 days, please contact the clinic through [employer] or phone. If you have a critical or abnormal lab result, we will notify you by phone as soon as possible.  Submit refill requests through [employer] or call your pharmacy and they will forward the refill request to us. Please allow 3 business days for your refill to be completed.          Additional Information About Your Visit        [employer] Information     [employer] lets you send messages to your doctor, view your test results, renew your prescriptions, schedule appointments and more. To sign up, go to [email or web address] . Click on \"Log in\" on the left side of the screen, which will take you to the Welcome page. Then click on \"Sign up Now\" " on the right side of the page.     You will be asked to enter the access code listed below, as well as some personal information. Please follow the directions to create your username and password.     Your access code is: [email or web address]  Expires: 2017  2:54 PM     Your access code will  in 90 days. If you need help or a new code, please call your [facility] or [phone].        Care EveryWhere ID     This is your Care EveryWhere ID. This could be used by other organizations to access your [place] medical records  [id number]        Your Vitals Were     Pulse Temperature Respirations Height BMI (Body Mass Index)       68 97.9  F (36.6  C) (Temporal) 12 6' (1.829 m) 33.36 kg/m2        Blood Pressure from Last 3 Encounters:   17 112/80   17 130/80   17 122/78    Weight from Last 3 Encounters:   17 246 lb (111.6 kg)   17 249 lb 3.2 oz (113 kg)   17 250 lb 6.4 oz (113.6 kg)              Today, you had the following     No orders found for display         Today's Medication Changes          These changes are accurate as of: 17  2:54 PM.  If you have any questions, ask your nurse or doctor.               Start taking these medicines.        Dose/Directions    traMADol 50 MG tablet   Commonly known as:  ULTRAM   Used for:  AC (acromioclavicular) joint arthritis   Started by:  Luisito Huang MD        Dose:  50 mg   Take 1 tablet (50 mg) by mouth every 6 hours as needed for pain Expected to last 4 months.   Quantity:  80 tablet   Refills:  0            Where to get your medicines      Some of these will need a paper prescription and others can be bought over the counter.  Ask your nurse if you have questions.     Bring a paper prescription for each of these medications     traMADol 50 MG tablet                Primary Care Provider Office Phone # Fax #    Luisito Huang [phone] [address] [address]        Equal  Access to Services     [facility]: Hadii ariel tillman binta Sotalia, waaxda luqadaha, qaybta kaalmada stephaniejonathanangela, maira reddytoniotasha escobar. So [facility] [phone].    ATENCIÓN: Si habla parker, tiene a nur disposición servicios gratuitos de asistencia lingüística. Llame al [phone].    We comply with applicable federal civil rights laws and [place] laws. We do not discriminate on the basis of race, color, national origin, age, disability sex, sexual orientation or gender identity.            Thank you!     Thank you for choosing [facility]  for your care. Our goal is always to provide you with excellent care. Hearing back from our patients is one way we can continue to improve our services. Please take a few minutes to complete the written survey that you may receive in the mail after your visit with us. Thank you!             Your Updated Medication List - Protect others around you: Learn how to safely use, store and throw away your medicines at www.disposemymeds.org.          This list is accurate as of: 9/20/17  2:54 PM.  Always use your most recent med list.                   Brand Name Dispense Instructions for use Diagnosis    cyclobenzaprine 10 MG tablet    FLEXERIL    30 tablet    Take 1 tablet (10 mg) by mouth every 12 hours as needed for muscle spasms    Overuse syndrome of mid back, subsequent encounter       DAILY MULTIVITAMIN PO      Take 1 tablet by mouth daily Reported on 5/12/2017        diclofenac 75 MG EC tablet    VOLTAREN    60 tablet    Take 1 tablet (75 mg) by mouth 2 times daily    Incomplete tear of right rotator cuff, AC (acromioclavicular) joint arthritis       Ginkgo Biloba 40 MG Tabs      Take 500 mg by mouth daily        LYSINE      1 tablet daily.        traMADol 50 MG tablet    ULTRAM    80 tablet    Take 1 tablet (50 mg) by mouth every 6 hours as needed for pain Expected to last 4 months.    AC (acromioclavicular) joint arthritis

## 2017-09-20 NOTE — NURSING NOTE
Chief Complaint   Patient presents with     Musculoskeletal Problem     follow up     Back Pain     Panel Management     mychart, flu shot       Initial /80  Pulse 68  Temp 97.9  F (36.6  C) (Temporal)  Resp 12  Ht 6' (1.829 m)  Wt 246 lb (111.6 kg)  BMI 33.36 kg/m2 Estimated body mass index is 33.36 kg/(m^2) as calculated from the following:    Height as of this encounter: 6' (1.829 m).    Weight as of this encounter: 246 lb (111.6 kg).  Medication Reconciliation: complete     Remi Sam MA

## 2017-09-20 NOTE — LETTER
2017      To Whom It Concerns:     RE: Work restrictions for  Sukhdeep Wolf  [address]  [place] [address]           Sukhdeep Wolf is under my professional care for Overuse Syndrome of his mid back and Partial Right Rotator Cuff Tear.     He may return to work with the followin. He may continue to work in his current position     2. Recommend limiting his work week to 5 days per week, 8-10 hours per day to avoid injury.      3. Recommend having 2 consecutive days off to recover (Saturday and ), to avoid injury.     4. Recommending limiting the amount of resistance needed involving the right arm due to a partial rotator cuff tear--stable at present.           Luisito Huang MD

## 2017-12-04 ENCOUNTER — TELEPHONE (OUTPATIENT)
Dept: FAMILY MEDICINE | Facility: CLINIC | Age: 47
End: 2017-12-04

## 2017-12-04 DIAGNOSIS — M19.019 AC (ACROMIOCLAVICULAR) JOINT ARTHRITIS: ICD-10-CM

## 2017-12-04 RX ORDER — TRAMADOL HYDROCHLORIDE 50 MG/1
50 TABLET ORAL EVERY 6 HOURS PRN
Qty: 80 TABLET | Refills: 0 | Status: SHIPPED | OUTPATIENT
Start: 2017-12-04 | End: 2018-09-28

## 2018-02-20 ENCOUNTER — TELEPHONE (OUTPATIENT)
Dept: FAMILY MEDICINE | Facility: CLINIC | Age: 48
End: 2018-02-20

## 2018-02-20 NOTE — PROGRESS NOTES
"  SUBJECTIVE:   Sukhdeep Wolf is a 47 year old male who presents to clinic today for the following health issues:              HPI     Joint Pain--    Continues to have ongoing concerns with overuse of the right shoulder which leads to increased pain and muscle spasm.  He currently is stable and is actually use less medication over time.  Uses less tramadol as well as Flexeril.  He is seen every 4 months to continue restrictions at work.  No changes are needed.    Onset: follow up shoulder, ongoing 1-2 years now     Description:   Location: right shoulder   Character:\" it depends what I do, sometimes sharp pain\"    Intensity: mild, moderate    Progression of Symptoms: worse- \"lately because used too much at work\"     Accompanying Signs & Symptoms:  Other symptoms: none    History:   Previous similar pain: YES    Precipitating factors:   Trauma or overuse: no     Alleviating factors:  Improved by: tramadol     Therapies Tried and outcome: tramadol the pain medication       Concern - irregular heart beat --    Over the past 24-48 hours, this patient's noted some variable heart rate readings on a fit bit watch.  Pulse rate is been between 130 and 140 at times but usually between 60 and 100.  He is not aware of any rapid heartbeat or palpitations.  There is been no chest pain or respiratory changes.  No obvious reasons noted other than possibly some increased anxiety with a job interview.  Caffeine use is moderate but unchanged.  No unusual drugs or use.  No stimulant use.  Has had no respiratory changes with the change in heart rate.  No lightheadedness or near syncope.    Onset: yesterday    Description:   Patient states that he bought a fit bit recently and yesterday noticed his heart reate increase to 138 for short time, . No chest pain, but slightly tight in the chest area, but patient also have the same symptoms with acid reflux    Intensity: none    Progression of Symptoms:  same    Accompanying Signs & " Symptoms:  Listed above     Previous history of similar problem:   Yes     Precipitating factors:   Worsened by: none     Alleviating factors:  Improved by: Tums     Therapies Tried and outcome: Tums   Problem list and histories reviewed & adjusted, as indicated.  Additional history: as documented            ROS:  CONSTITUTIONAL: NEGATIVE for fever, chills, change in weight  INTEGUMENTARY/SKIN: NEGATIVE for worrisome rashes, moles or lesions  EYES: NEGATIVE for vision changes or irritation  ENT/MOUTH: NEGATIVE for ear, mouth and throat problems  RESP: NEGATIVE for significant cough or SOB  CV: NEGATIVE for chest pain, palpitations or peripheral edema  CV: Asymptomatic elevation of heart rate as noted above.  GI: NEGATIVE for nausea, abdominal pain, heartburn, or change in bowel habits  : NEGATIVE for frequency, dysuria, or hematuria  MUSCULOSKELETAL: No major changes in the right upper back/shoulder region as long as restrictions are maintained at work.  NEURO: NEGATIVE for weakness, dizziness or paresthesias  ENDOCRINE: NEGATIVE for temperature intolerance, skin/hair changes  HEME: NEGATIVE for bleeding problems  PSYCHIATRIC: NEGATIVE for changes in mood or affect    OBJECTIVE:                                                    /80  Pulse 84  Temp 98.2  F (36.8  C) (Temporal)  Ht 6' (1.829 m)  Wt 244 lb (110.7 kg)  SpO2 98%  BMI 33.09 kg/m2  Body mass index is 33.09 kg/(m^2).  GENERAL: healthy, alert and no distress  EYES: Eyes grossly normal to inspection, extraocular movements - intact, and PERRL  HENT: ear canals- normal; TMs- normal; Nose- normal; Mouth- no ulcers, no lesions  NECK: no tenderness, no adenopathy, no asymmetry, no masses, no stiffness; thyroid- normal to palpation  RESP: lungs clear to auscultation - no rales, no rhonchi, no wheezes  BREAST: no masses, no tenderness, no nipple discharge, no palpable axillary masses or adenopathy  CV: regular rates and rhythm, normal S1 S2, no S3 or  S4 and no murmur, no click or rub -  ABDOMEN: soft, no tenderness, no  hepatosplenomegaly, no masses, normal bowel sounds  MS: extremities- no gross deformities noted, no edema  MS: No tenderness or changes noted in the right upper back in the periscapular region.  No palpable spasm or tenderness.  SKIN: no suspicious lesions, no rashes  NEURO: strength and tone- normal, sensory exam- grossly normal, mentation- intact, speech- normal, reflexes- symmetric  BACK: no CVA tenderness, no paralumbar tenderness  PSYCH: Alert and oriented times 3; speech- coherent , normal rate and volume; able to articulate logical thoughts, able to abstract reason, no tangential thoughts, no hallucinations or delusions, affect- normal  LYMPHATICS: ant. cervical- normal, post. cervical- normal, axillary- normal, supraclavicular- normal, inguinal- normal    Diagnostic test results:  Diagnostic Test Results:  Results for orders placed or performed in visit on 02/21/18 (from the past 24 hour(s))   EKG 12-lead complete w/read - Clinics    Impression    Normal sinus rhythm.  Slight left axis.  Decreased R-wave progression over the anterior leads but no ischemic changes and normal ST-T wave changes.  Impression: Sinus rhythm with left axis. Luisito Huang MD     Basic metabolic panel   Result Value Ref Range    Sodium 139 133 - 144 mmol/L    Potassium 3.7 3.4 - 5.3 mmol/L    Chloride 106 94 - 109 mmol/L    Carbon Dioxide 26 20 - 32 mmol/L    Anion Gap 7 3 - 14 mmol/L    Glucose 86 70 - 99 mg/dL    Urea Nitrogen 16 7 - 30 mg/dL    Creatinine 0.97 0.66 - 1.25 mg/dL    GFR Estimate 83 >60 mL/min/1.7m2    GFR Estimate If Black >90 >60 mL/min/1.7m2    Calcium 9.1 8.5 - 10.1 mg/dL   TSH   Result Value Ref Range    TSH 9.36 (H) 0.40 - 4.00 mU/L   T4, free   Result Value Ref Range    T4 Free 0.71 (L) 0.76 - 1.46 ng/dL   CBC with platelets   Result Value Ref Range    WBC 8.0 4.0 - 11.0 10e9/L    RBC Count 5.43 4.4 - 5.9 10e12/L    Hemoglobin 16.3 13.3 -  17.7 g/dL    Hematocrit 46.7 40.0 - 53.0 %    MCV 86 78 - 100 fl    MCH 30.0 26.5 - 33.0 pg    MCHC 34.9 31.5 - 36.5 g/dL    RDW 13.3 10.0 - 15.0 %    Platelet Count 184 150 - 450 10e9/L        ASSESSMENT/PLAN:                                                    1. Irregular heart rate  Likely sinus rhythm with sinus tachycardia. Check for tachycardia arrhythmias.  We will be checking a 10 day Holter monitor to rule out atrial flutter or atrial fibrillation.  - EKG 12-lead complete w/read - Clinics  - Basic metabolic panel  - TSH  - T4, free  - CBC with platelets  - Zio Patch Holter; Future    2. Overuse syndrome of mid back, subsequent encounter  Stable and restrictions to continue    3. Chronic pain syndrome  Stable with decreasing use of Tramadol.    4. Hypothyroidism, unspecified type  Asymptomatic and a new diagnosis.  Tests run to check for hyperthyroidism given the tachycardia.  Will need to begin thyroid replacement therapy after the patient's been informed.  Likely start with 50 mcg of Levothyroxine and check antithyroid antibodies.  - ANTI THYROGLOBULIN ANTIBODY  - THYROID PEROXIDASE ANTIBODY      Follow up with Provider -Likely follow-up in 4 months with a TSH/T4 to be drawn in 8 weeks following the start of thyroid replacement therapy.  See Patient Instructions    The patient understood the rational for the diagnosis and treatment plan. All questions were answered to best of my ability and the patient's satisfaction.    Note: Chart documentation done in part with Dragon Voice Recognition software. Although reviewed after completion, some word and grammatical errors may remain.  Please consider this when interpreting information in this chart.      Luisito Huang MD  [facility]

## 2018-02-20 NOTE — TELEPHONE ENCOUNTER
Sukhdeep Wolf is a 47 year old male who calls with irregular heart beats.    NURSING ASSESSMENT:  Description:  Spoke with patient. Has a fit bit watch and watching heart rate. 110-130s, 63 now. Feels   Onset/duration:  Started today  Precip. factors:  Did have interview today  Associated symptoms:  None: no SOB, no chest pain  Improves/worsens symptoms:  Improves with rest  Pain scale (0-10)   0/10  LMP/preg/breast feeding:  NA  Last exam/Treatment:  09/20/2017  Allergies: No Known Allergies    MEDICATIONS: no changes in medications  Taking medication(s) as prescribed? Yes  Taking over the counter medication(s?) No  Any medication side effects? No significant side effects    Any barriers to taking medication(s) as prescribed?  No  Medication(s) improving/managing symptoms?  N/A  Medication reconciliation completed: No      NURSING PLAN: Nursing advice to patient rest as able today, return call if symptoms worsen,  appointment made for tomorrow    RECOMMENDED DISPOSITION:  See in 24 hours -   Will comply with recommendation: Yes  If further questions/concerns or if symptoms do not improve, worsen or new symptoms develop, call your PCP or [facility] as soon as possible.      Guideline used:  Telephone Triage Protocols for Nurses, Fifth Edition, Justine Mejia, RN, BSN

## 2018-02-21 ENCOUNTER — OFFICE VISIT (OUTPATIENT)
Dept: FAMILY MEDICINE | Facility: CLINIC | Age: 48
End: 2018-02-21
Payer: COMMERCIAL

## 2018-02-21 VITALS
WEIGHT: 244 LBS | SYSTOLIC BLOOD PRESSURE: 122 MMHG | BODY MASS INDEX: 33.05 KG/M2 | OXYGEN SATURATION: 98 % | TEMPERATURE: 98.2 F | HEART RATE: 84 BPM | DIASTOLIC BLOOD PRESSURE: 80 MMHG | HEIGHT: 72 IN

## 2018-02-21 DIAGNOSIS — G89.4 CHRONIC PAIN SYNDROME: ICD-10-CM

## 2018-02-21 DIAGNOSIS — I49.9 IRREGULAR HEART RATE: Primary | ICD-10-CM

## 2018-02-21 DIAGNOSIS — S29.012D OVERUSE SYNDROME OF MID BACK, SUBSEQUENT ENCOUNTER: ICD-10-CM

## 2018-02-21 DIAGNOSIS — E03.9 HYPOTHYROIDISM, UNSPECIFIED TYPE: ICD-10-CM
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
## 2018-02-21 LAB
ANION GAP SERPL CALCULATED.3IONS-SCNC: 7 MMOL/L (ref 3–14)
BUN SERPL-MCNC: 16 MG/DL (ref 7–30)
CALCIUM SERPL-MCNC: 9.1 MG/DL (ref 8.5–10.1)
CHLORIDE SERPL-SCNC: 106 MMOL/L (ref 94–109)
CO2 SERPL-SCNC: 26 MMOL/L (ref 20–32)
CREAT SERPL-MCNC: 0.97 MG/DL (ref 0.66–1.25)
ERYTHROCYTE [DISTWIDTH] IN BLOOD BY AUTOMATED COUNT: 13.3 % (ref 10–15)
GFR SERPL CREATININE-BSD FRML MDRD: 83 ML/MIN/1.7M2
GLUCOSE SERPL-MCNC: 86 MG/DL (ref 70–99)
HCT VFR BLD AUTO: 46.7 % (ref 40–53)
HGB BLD-MCNC: 16.3 G/DL (ref 13.3–17.7)
MCH RBC QN AUTO: 30 PG (ref 26.5–33)
MCHC RBC AUTO-ENTMCNC: 34.9 G/DL (ref 31.5–36.5)
MCV RBC AUTO: 86 FL (ref 78–100)
PLATELET # BLD AUTO: 184 10E9/L (ref 150–450)
POTASSIUM SERPL-SCNC: 3.7 MMOL/L (ref 3.4–5.3)
RBC # BLD AUTO: 5.43 10E12/L (ref 4.4–5.9)
SODIUM SERPL-SCNC: 139 MMOL/L (ref 133–144)
T4 FREE SERPL-MCNC: 0.71 NG/DL (ref 0.76–1.46)
TSH SERPL DL<=0.005 MIU/L-ACNC: 9.36 MU/L (ref 0.4–4)
WBC # BLD AUTO: 8 10E9/L (ref 4–11)

## 2018-02-21 PROCEDURE — 99214 OFFICE O/P EST MOD 30 MIN: CPT | Performed by: FAMILY MEDICINE

## 2018-02-21 PROCEDURE — 85027 COMPLETE CBC AUTOMATED: CPT | Performed by: FAMILY MEDICINE

## 2018-02-21 PROCEDURE — 84443 ASSAY THYROID STIM HORMONE: CPT | Performed by: FAMILY MEDICINE

## 2018-02-21 PROCEDURE — 93000 ELECTROCARDIOGRAM COMPLETE: CPT | Performed by: FAMILY MEDICINE

## 2018-02-21 PROCEDURE — 36415 COLL VENOUS BLD VENIPUNCTURE: CPT | Performed by: FAMILY MEDICINE

## 2018-02-21 PROCEDURE — 84439 ASSAY OF FREE THYROXINE: CPT | Performed by: FAMILY MEDICINE

## 2018-02-21 PROCEDURE — 80048 BASIC METABOLIC PNL TOTAL CA: CPT | Performed by: FAMILY MEDICINE

## 2018-02-21 ASSESSMENT — PAIN SCALES - GENERAL: PAINLEVEL: MILD PAIN (2)

## 2018-02-21 NOTE — MR AVS SNAPSHOT
After Visit Summary   2/21/2018    Sukhdeep Wolf    MRN: [id number]           Patient Information     Date Of Birth          1970        Visit Information        Provider Department      2/21/2018 11:40 AM Luisito Huang MD [facility]        Today's Diagnoses     Irregular heart rate    -  1    Overuse syndrome of mid back, subsequent encounter        Chronic pain syndrome          Care Instructions    These are new changes to your current plan of care based on today's visit:    Medications stopped    Medications to be started    Change dose of this medication None   New treatments        Follow up appointments:    1.  FOLLOW UP WITH YOUR PRIMARY CARE PROVIDER: 4 month(s) for clinic follow up    2. FOLLOW UP WITH SPECIALIST:     3. FOLLOW UP WITH NURSE VISIT:     4. IMAGING STUDIES TO BE SCHEDULED:     5. PROCEDURES TO BE SCHEDULED: ZioPatch for heart monitoring    Luisito Huang MD              Follow-ups after your visit        Follow-up notes from your care team     Return in about 4 months (around 6/21/2018) for Routine Visit.      Your next 10 appointments already scheduled     Feb 23, 2018 10:00 AM CST   ZIOPATCH MONITOR with MG DEVICE RM, MG CV TECH   [facility]    [id number] [place] [id number] [phone]              Future tests that were ordered for you today     Open Future Orders        Priority Expected Expires Ordered    Zio Patch Holter Routine  4/7/2018 2/21/2018            Who to contact     If you have questions or need follow up information about today's clinic visit or your schedule please contact [facility] directly at [phone].  Normal or non-critical lab and imaging results will be communicated to you by MyChart, letter or phone within 4 business days after the clinic has received the results. If you do not hear from us within 7 days, please contact the clinic through  "[employer] or phone. If you have a critical or abnormal lab result, we will notify you by phone as soon as possible.  Submit refill requests through [employer] or call your pharmacy and they will forward the refill request to us. Please allow 3 business days for your refill to be completed.          Additional Information About Your Visit        [employer] Information     [employer] lets you send messages to your doctor, view your test results, renew your prescriptions, schedule appointments and more. To sign up, go to [email or web address] . Click on \"Log in\" on the left side of the screen, which will take you to the Welcome page. Then click on \"Sign up Now\" on the right side of the page.     You will be asked to enter the access code listed below, as well as some personal information. Please follow the directions to create your username and password.     Your access code is: [email or web address]  Expires: 2018 12:18 PM     Your access code will  in 90 days. If you need help or a new code, please call your [facility] clinic or [phone].        Care EveryWhere ID     This is your Care EveryWhere ID. This could be used by other organizations to access your [facility] medical records  [id number]        Your Vitals Were     Pulse Temperature Height Pulse Oximetry BMI (Body Mass Index)       84 98.2  F (36.8  C) (Temporal) 6' (1.829 m) 98% 33.09 kg/m2        Blood Pressure from Last 3 Encounters:   18 122/80   17 112/80   17 130/80    Weight from Last 3 Encounters:   18 244 lb (110.7 kg)   17 246 lb (111.6 kg)   17 249 lb 3.2 oz (113 kg)              We Performed the Following     Basic metabolic panel     CBC with platelets     EKG 12-lead complete w/read - Clinics     T4, free     TSH        Primary Care Provider Office Phone # Fax #    Luisito Huang [phone] [address]  [place] [address]        Equal Access to Services     GUERLINE PATEL AH: Hadii " ariel Cage, waaxda luqadaha, qaybta kaalmada nathaly, maira adonayin hayaan stephaneiadriana low lajustinfatimah shawn. So [facility] [phone].    ATENCIÓN: Si filipela parker, tiene a nur disposición servicios gratuitos de asistencia lingüística. Llame al [phone].    We comply with applicable federal civil rights laws and [place] laws. We do not discriminate on the basis of race, color, national origin, age, disability, sex, sexual orientation, or gender identity.            Thank you!     Thank you for choosing [facility]  for your care. Our goal is always to provide you with excellent care. Hearing back from our patients is one way we can continue to improve our services. Please take a few minutes to complete the written survey that you may receive in the mail after your visit with us. Thank you!             Your Updated Medication List - Protect others around you: Learn how to safely use, store and throw away your medicines at www.disposemymeds.org.          This list is accurate as of 2/21/18 12:25 PM.  Always use your most recent med list.                   Brand Name Dispense Instructions for use Diagnosis    cyclobenzaprine 10 MG tablet    FLEXERIL    30 tablet    Take 1 tablet (10 mg) by mouth every 12 hours as needed for muscle spasms    Overuse syndrome of mid back, subsequent encounter       DAILY MULTIVITAMIN PO      Take 1 tablet by mouth daily Reported on 5/12/2017        diclofenac 75 MG EC tablet    VOLTAREN    60 tablet    Take 1 tablet (75 mg) by mouth 2 times daily    Incomplete tear of right rotator cuff, AC (acromioclavicular) joint arthritis       Ginkgo Biloba 40 MG Tabs      Take 500 mg by mouth daily        LYSINE      1 tablet daily.        NEW MED      Take dietary tablet daily before meal.  Name of the medication: Rosales        traMADol 50 MG tablet    ULTRAM    80 tablet    Take 1 tablet (50 mg) by mouth every 6 hours as needed for pain Expected to last 4 months.    AC  (acromioclavicular) joint arthritis       ZANTAC PO      Take 150 mg by mouth daily

## 2018-02-21 NOTE — LETTER
2018      To Whom It Concerns:     RE: Work restrictions for  Sukhdeep Wolf  [address]  [facility] [address]           Sukhdeep Wolf is under my professional care for Overuse Syndrome of his mid back and Partial Right Rotator Cuff Tear.     He may return to work with the followin. He may continue to work in his current position     2. Recommend limiting his work week to 5 days per week, 8-10 hours per day to avoid injury.      3. Recommend having 2 consecutive days off to recover (Saturday and ), to avoid injury.     4. Recommending limiting the amount of resistance needed involving the right arm due to a partial rotator cuff tear--stable at present.           Luisito Huang MD

## 2018-02-21 NOTE — PATIENT INSTRUCTIONS
These are new changes to your current plan of care based on today's visit:    Medications stopped    Medications to be started    Change dose of this medication None   New treatments        Follow up appointments:    1.  FOLLOW UP WITH YOUR PRIMARY CARE PROVIDER: 4 month(s) for clinic follow up    2. FOLLOW UP WITH SPECIALIST:     3. FOLLOW UP WITH NURSE VISIT:     4. IMAGING STUDIES TO BE SCHEDULED:     5. PROCEDURES TO BE SCHEDULED: ZioPatch for heart monitoring    Luisito Huang MD

## 2018-02-21 NOTE — LETTER
February 22, 2018      Sukhdeep Wolf  [address]  [place] [address]              Dear Sukhdeep,    Elisabeth is a copy of your laboratory values that we discussed on the phone.  The elevated TSH and T4 indicated primary hypothyroidism, basically a failure of the thyroid gland to produce adequate thyroid hormone.  It does not mean any cancer or serious illness but does indicate a lifelong need for supplemental thyroid hormone to be taken.    Enclosed is some information on hypothyroidism, Levothyroxine which is being prescribed as the thyroid supplement and some information on the two lab tests.  As mentioned on the phone, please return during the last week in April for a lab appointment to recheck the thyroid studies.  We will recheck those levels again in 4 months when you return for your regular visit.  As mentioned, you do not need to fast for the repeat blood tests.    Please call with any questions or concerns and thank you for your confidence.          Sincerely,      Luisito Huang MD

## 2018-02-22 ENCOUNTER — TELEPHONE (OUTPATIENT)
Dept: LAB | Facility: CLINIC | Age: 48
End: 2018-02-22

## 2018-02-22 ENCOUNTER — TELEPHONE (OUTPATIENT)
Dept: FAMILY MEDICINE | Facility: CLINIC | Age: 48
End: 2018-02-22

## 2018-02-22 DIAGNOSIS — E03.9 HYPOTHYROIDISM, UNSPECIFIED TYPE: Primary | ICD-10-CM

## 2018-02-22 RX ORDER — LEVOTHYROXINE SODIUM 50 UG/1
50 TABLET ORAL DAILY
Qty: 90 TABLET | Refills: 1 | Status: SHIPPED | OUTPATIENT
Start: 2018-02-22 | End: 2018-10-01 | Stop reason: DRUGHIGH

## 2018-02-22 NOTE — TELEPHONE ENCOUNTER
Good Morning,    We were unable to add the Anti Thyroglobulin Antibody & Thyroid Peroxidase Antibody tests to his blood draw from last night. I replaced the orders in future.    Please advise if you would like me to call him.    Thank you,  Rani

## 2018-02-22 NOTE — TELEPHONE ENCOUNTER
The patient was called and informed of results.  His basic metabolic profile and CBC were normal.    Was also found to have asymptomatic hypothyroidism with a TSH over 9.00 slightly low T4.    Discussed the meaning of the findings and the fact that his mild tachycardia was not to new diagnosis of hypothyroidism.    Plan:    #1.  Information on lab results being sent to the patient  #2.  Begin Levothyroxine 50 mcg daily.  Advised to take on an empty stomach at night and to take it in the same manner daily.  #3.  Lab only visit in 2 months for a TSH, T4 and antithyroid antibodies.  4.  Follow-up in 4 months as planned with a repeat thyroid test at that time.    The patient understood the rational for the diagnosis and treatment plan. All questions were answered to best of my ability and the patient's satisfaction.    Note: Chart documentation done in part with Dragon Voice Recognition software. Although reviewed after completion, some word and grammatical errors may remain.  Please consider this when interpreting information in this chart.    Luisito Huang MD

## 2018-02-23 ENCOUNTER — ALLIED HEALTH/NURSE VISIT (OUTPATIENT)
Dept: CARDIOLOGY | Facility: CLINIC | Age: 48
End: 2018-02-23
Attending: FAMILY MEDICINE
Payer: COMMERCIAL

## 2018-02-23 DIAGNOSIS — I49.9 IRREGULAR HEART RATE: ICD-10-CM

## 2018-02-23 PROCEDURE — 0296T ZIO PATCH HOLTER: CPT

## 2018-02-23 PROCEDURE — 0298T ZZC EXT ECG > 48HR TO 21 DAY REVIEW AND INTERPRETATN: CPT

## 2018-02-26 ENCOUNTER — TELEPHONE (OUTPATIENT)
Dept: FAMILY MEDICINE | Facility: CLINIC | Age: 48
End: 2018-02-26

## 2018-02-26 NOTE — TELEPHONE ENCOUNTER
Patient calling to let us know that he paid out of pocket this time around but we still need to do the PA so next time is not a hassle.     Melissa Garcia  Reception/ Scheduling

## 2018-02-26 NOTE — TELEPHONE ENCOUNTER
Need a PA--    Tramadol is used only sporadically. Does not need stronger pain medications. Has failed Tylenol and NSAIDS when increased myofascial pain is experienced.    Luisito Huang MD

## 2018-02-26 NOTE — TELEPHONE ENCOUNTER
Prior Authorization Retail Medication Request  Medication/Dose: traMADol (ULTRAM) 50 MG tablet    Provider, do you want to change the medication or start a PA?  Please advise on previously Tried and Failed Therapies:      Insurance ID (if provided): [id number]  Insurance Phone (if provided): [phone]

## 2018-03-01 NOTE — TELEPHONE ENCOUNTER
Prior Authorization Approval    Authorization Effective Date: 3/1/2018  Authorization Expiration Date: 3/1/2019  Medication: traMADol (ULTRAM) 50 MG-APPROVED  Approved Dose/Quantity:    Reference #:     Insurance Company: [employer] [phone] Fax [phone]  Expected CoPay: UNKNOWN     CoPay Card Available:      Foundation Assistance Needed:    Which Pharmacy is filling the prescription (Not needed for infusion/clinic administered): [facility] [address] [place] [address] [address] [facility]  Pharmacy Notified: Yes  Patient Notified: Yes

## 2018-03-15 ENCOUNTER — TELEPHONE (OUTPATIENT)
Dept: FAMILY MEDICINE | Facility: CLINIC | Age: 48
End: 2018-03-15

## 2018-03-15 NOTE — TELEPHONE ENCOUNTER
The patient was called and informed of his 10 day Holter monitor results.  Mainly was sinus rhythm with occasional tachycardia but no malignant arrhythmias.  He had rare PACs and PVCs.  He may have had those when he was ill and was using some antihistamine/decongestants for respiratory illness.  He was totally asymptomatic concerning the heart rhythm.    He had a normal stress echocardiogram in September 2016 and is likely not to have underlying ischemic disease of any critical nature.    No further workup appears indicated and there is no concern of the irregular heartbeat.  Appears benign.    The patient understood the rational for the diagnosis and treatment plan. All questions were answered to best of my ability and the patient's satisfaction.    Luisito Huang MD

## 2018-05-26 NOTE — TELEPHONE ENCOUNTER
Reason for Call:  Medication or medication refill:    Do you use a [facility]?  Name of the pharmacy and phone number for the current request:  [facility] - [phone]    Name of the medication requested: Tramadol     Other request: patient states this prescription only had a qty of 20 and it should be 80 so the patient is out of medication.    Can we leave a detailed message on this number? YES    Phone number patient can be reached at: Home number on file [phone] (home) or Cell number on file:    Telephone Information:   Mobile [phone]       Best Time: anytime    Call taken on 12/4/2017 at 2:54 PM by Aide Oh       DC instructions

## 2018-09-25 NOTE — PROGRESS NOTES
SUBJECTIVE:   Sukhdeep Wolf is a 48 year old male who presents to clinic today for the following health issues:      HPI  Hypothyroidism Follow-up--Began on thyroid replacement therapy with an elevated TSH of over 9.  Replacement therapy began in February 2018.  He did not return as requested for TSH and a T4 3 months later.  He is currently on Levothyroxine 50 mcg daily and has been taking it regularly.  He states he still feels slightly fatigued.  Labs will be rechecked today.      Since last visit, patient describes the following symptoms: FATIGUED always throughout the day and headaches at the end of the day.  Weight stable, no hair loss, no skin changes, no constipation, no loose stools    Back Pain Follow Up--has intermittent right upper back pain related to rotator cuff disease of the right arm and AC joint arthritis.  He has some periscapular muscular pain if he overworks at his job.  He has been on long-term restrictions in terms of hours and activities and has done reasonably well.  He has occasional use of Robaxin.  He has occasional use of Tramadol 50 mg.  He is also on Voltaren but uses it as needed, mainly when he uses Tramadol.     His last tramadol prescription was in December 2017 for 80 tablets.  He still has some of that supply left.  He does not require use daily.      Description:   Location of pain:  Lower mid, mostly right shoulder blade occasionally both shoulder blades   Character of pain: constant tightness   Pain radiation: Does not radiate  Since last visit, pain is:  No change   New numbness or weakness in legs, not attributed to pain:  no     Intensity: Currently 1/10    History:   Pain interferes with job: No   Therapies tried with relief: pain medications currently help with back pain            Accompanying Signs & Symptoms:  Risk of Fracture:  None  Risk of Cauda Equina:  None  Risk of Infection:  None  Risk of Cancer:  None      Problem list and histories reviewed & adjusted, as  indicated.  Additional history: as documented        BP Readings from Last 3 Encounters:   09/28/18 118/78   02/21/18 122/80   09/20/17 112/80    Wt Readings from Last 3 Encounters:   09/28/18 250 lb 8 oz (113.6 kg)   02/21/18 244 lb (110.7 kg)   09/20/17 246 lb (111.6 kg)                    ROS:  CONSTITUTIONAL: NEGATIVE for fever, chills, change in weight  CONSTITUTIONAL: States he has some ongoing chronic fatigue and may very well need adjustments in thyroid medication.  INTEGUMENTARY/SKIN: NEGATIVE for worrisome rashes, moles or lesions  EYES: NEGATIVE for vision changes or irritation  ENT/MOUTH: NEGATIVE for ear, mouth and throat problems  RESP: NEGATIVE for significant cough or SOB  CV: NEGATIVE for chest pain, palpitations or peripheral edema  GI: NEGATIVE for nausea, abdominal pain, heartburn, or change in bowel habits  : NEGATIVE for frequency, dysuria, or hematuria  MUSCULOSKELETAL: Relatively stable AC joint arthritis and right rotator cuff disease.  Has pain mainly with internal and external rotation but also reaching behind his back.  Has intermittent pain due to overuse involving the periscapular muscles.  Uses very little Tramadol over time.  Restrictions at work have been very helpful.  NEURO: NEGATIVE for weakness, dizziness or paresthesias  ENDOCRINE: On Levothyroxine 50 mcg daily.  Will recheck labs today.  HEME: NEGATIVE for bleeding problems  PSYCHIATRIC: NEGATIVE for changes in mood or affect    OBJECTIVE:                                                    /78  Pulse 81  Temp 98.3  F (36.8  C) (Temporal)  Resp 18  Ht 6' (1.829 m)  Wt 250 lb 8 oz (113.6 kg)  SpO2 96%  BMI 33.97 kg/m2  Body mass index is 33.97 kg/(m^2).  GENERAL: healthy, alert and no distress  GENERAL: cooperative and over weight  EYES: Eyes grossly normal to inspection, extraocular movements - intact, and PERRL  HENT: ear canals- normal; TMs- normal; Nose- normal; Mouth- no ulcers, no lesions  NECK: no  tenderness, no adenopathy, no asymmetry, no masses, no stiffness; thyroid- normal to palpation  RESP: lungs clear to auscultation - no rales, no rhonchi, no wheezes  BREAST: no masses, no tenderness, no nipple discharge, no palpable axillary masses or adenopathy  CV: regular rates and rhythm, normal S1 S2, no S3 or S4 and no murmur, no click or rub -  ABDOMEN: soft, no tenderness, no  hepatosplenomegaly, no masses, normal bowel sounds  MS: extremities- no gross deformities noted, no edema  MS: No palpable periscapular muscle spasm or tenderness.  No major tenderness of the AC joint on the right.  Has pain with internal and external rotation when checked to the limits and extremes.  Able to abduct to about 80 degrees.  CMS otherwise intact.  SKIN: no suspicious lesions, no rashes  NEURO: strength and tone- normal, sensory exam- grossly normal, mentation- intact, speech- normal, reflexes- symmetric  PSYCH: Alert and oriented times 3; speech- coherent , normal rate and volume; able to articulate logical thoughts, able to abstract reason, no tangential thoughts, no hallucinations or delusions, affect- normal  LYMPHATICS: ant. cervical- normal, post. cervical- normal, axillary- normal, supraclavicular- normal, inguinal- normal    Diagnostic test results:  Diagnostic Test Results:  Thyroid studies and urine drug screen in process.     ASSESSMENT/PLAN:                                                    1. Hypothyroidism, unspecified type  Checking labs today and adjust dose accordingly.  Recheck thyroid labs in November.  - TSH  - T4, free  - Anti thyroglobulin antibody  - Thyroid peroxidase antibody    2. Chronic pain syndrome  Renew medications currently being prescribed.  He will need to establish in January with a new provider.  Does seem to be abiding by CSA and not over utilizing opioid therapy.  - diclofenac (VOLTAREN) 75 MG EC tablet; Take 1 tablet (75 mg) by mouth 2 times daily  Dispense: 60 tablet; Refill: 11  -  "traMADol (ULTRAM) 50 MG tablet; Take 1 tablet (50 mg) by mouth every 6 hours as needed for pain Expected to last 4 months.  Dispense: 80 tablet; Refill: 0    3. Overuse syndrome of mid back, subsequent encounter  History as noted  - UCI5961 - Pain Drug Screen, Urine, with reported meds (MedTox)  - cyclobenzaprine (FLEXERIL) 10 MG tablet; Take 1 tablet (10 mg) by mouth every 12 hours as needed for muscle spasms  Dispense: 30 tablet; Refill: 2    4. Incomplete tear of right rotator cuff  History as noted  - WDH3231 - Pain Drug Screen, Urine, with reported meds (MedTox)  - diclofenac (VOLTAREN) 75 MG EC tablet; Take 1 tablet (75 mg) by mouth 2 times daily  Dispense: 60 tablet; Refill: 11    5. AC (acromioclavicular) joint arthritis  History as noted.  - [id number] - Pain Drug Screen, Urine, with reported meds (MedTox)  - diclofenac (VOLTAREN) 75 MG EC tablet; Take 1 tablet (75 mg) by mouth 2 times daily  Dispense: 60 tablet; Refill: 11  - traMADol (ULTRAM) 50 MG tablet; Take 1 tablet (50 mg) by mouth every 6 hours as needed for pain Expected to last 4 months.  Dispense: 80 tablet; Refill: 0      An updated work restriction note generated with a \"copy and paste\" from the previous letter.  Should be good for 4 months.    Follow up with Provider -Follow-up in 4 months.  See Patient Instructions    The patient understood the rational for the diagnosis and treatment plan. All questions were answered to best of my ability and the patient's satisfaction.    Note: Chart documentation done in part with Dragon Voice Recognition software. Although reviewed after completion, some word and grammatical errors may remain.  Please consider this when interpreting information in this chart.      Luisito Huang MD  [facility]  Answers for HPI/ROS submitted by the patient on 9/28/2018   PHQ-2 Score: 0  PHQ9 TOTAL SCORE: Incomplete    "

## 2018-09-28 ENCOUNTER — OFFICE VISIT (OUTPATIENT)
Dept: FAMILY MEDICINE | Facility: CLINIC | Age: 48
End: 2018-09-28
Payer: COMMERCIAL

## 2018-09-28 VITALS
RESPIRATION RATE: 18 BRPM | DIASTOLIC BLOOD PRESSURE: 78 MMHG | HEART RATE: 81 BPM | WEIGHT: 250.5 LBS | SYSTOLIC BLOOD PRESSURE: 118 MMHG | BODY MASS INDEX: 33.93 KG/M2 | TEMPERATURE: 98.3 F | OXYGEN SATURATION: 96 % | HEIGHT: 72 IN
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
## 2018-09-28 DIAGNOSIS — E03.9 HYPOTHYROIDISM, UNSPECIFIED TYPE: ICD-10-CM

## 2018-09-28 DIAGNOSIS — M19.019 AC (ACROMIOCLAVICULAR) JOINT ARTHRITIS: ICD-10-CM

## 2018-09-28 DIAGNOSIS — S29.012D OVERUSE SYNDROME OF MID BACK, SUBSEQUENT ENCOUNTER: ICD-10-CM

## 2018-09-28 DIAGNOSIS — G89.4 CHRONIC PAIN SYNDROME: Primary | ICD-10-CM

## 2018-09-28 DIAGNOSIS — M75.111 INCOMPLETE TEAR OF RIGHT ROTATOR CUFF: ICD-10-CM

## 2018-09-28 LAB
T4 FREE SERPL-MCNC: 0.88 NG/DL (ref 0.76–1.46)
TSH SERPL DL<=0.005 MIU/L-ACNC: 6.24 MU/L (ref 0.4–4)

## 2018-09-28 PROCEDURE — 99214 OFFICE O/P EST MOD 30 MIN: CPT | Performed by: FAMILY MEDICINE

## 2018-09-28 PROCEDURE — 36415 COLL VENOUS BLD VENIPUNCTURE: CPT | Performed by: FAMILY MEDICINE

## 2018-09-28 PROCEDURE — 80307 DRUG TEST PRSMV CHEM ANLYZR: CPT | Mod: 90 | Performed by: FAMILY MEDICINE

## 2018-09-28 PROCEDURE — 84439 ASSAY OF FREE THYROXINE: CPT | Performed by: FAMILY MEDICINE

## 2018-09-28 PROCEDURE — 99000 SPECIMEN HANDLING OFFICE-LAB: CPT | Performed by: FAMILY MEDICINE

## 2018-09-28 PROCEDURE — 84443 ASSAY THYROID STIM HORMONE: CPT | Performed by: FAMILY MEDICINE

## 2018-09-28 PROCEDURE — 86376 MICROSOMAL ANTIBODY EACH: CPT | Performed by: FAMILY MEDICINE

## 2018-09-28 PROCEDURE — 86800 THYROGLOBULIN ANTIBODY: CPT | Performed by: FAMILY MEDICINE

## 2018-09-28 RX ORDER — CYCLOBENZAPRINE HCL 10 MG
10 TABLET ORAL EVERY 12 HOURS PRN
Qty: 30 TABLET | Refills: 2 | Status: SHIPPED | OUTPATIENT
Start: 2018-09-28 | End: 2019-09-13

## 2018-09-28 RX ORDER — DICLOFENAC SODIUM 75 MG/1
75 TABLET, DELAYED RELEASE ORAL 2 TIMES DAILY
Qty: 60 TABLET | Refills: 11 | Status: SHIPPED | OUTPATIENT
Start: 2018-09-28 | End: 2019-09-13

## 2018-09-28 RX ORDER — TRAMADOL HYDROCHLORIDE 50 MG/1
50 TABLET ORAL EVERY 6 HOURS PRN
Qty: 80 TABLET | Refills: 0 | Status: SHIPPED | OUTPATIENT
Start: 2018-09-28 | End: 2019-01-15

## 2018-09-28 ASSESSMENT — PAIN SCALES - GENERAL: PAINLEVEL: NO PAIN (1)

## 2018-09-28 NOTE — LETTER
2018      To Whom It Concerns:     RE: Work restrictions for  Sukhdeep Wolf  [address]  [place] [facility] [address]           Sukhdeep oWlf is under my professional care for Overuse Syndrome of his mid back and Partial Right Rotator Cuff Tear.     He may return to work with the followin. He may continue to work in his current position     2. Recommend limiting his work week to 5 days per week, 8-10 hours per day to avoid injury.      3. Recommend having 2 consecutive days off to recover (Saturday and ), to avoid injury.     4. Recommending limiting the amount of resistance needed involving the right arm due to a partial rotator cuff tear--stable at present.           Luisito Huang MD

## 2018-09-28 NOTE — MR AVS SNAPSHOT
"              After Visit Summary   9/28/2018    Sukhdeep Wolf    MRN: [id number]           Patient Information     Date Of Birth          1970        Visit Information        Provider Department      9/28/2018 11:20 AM Luisito Huang MD [facility]        Today's Diagnoses     Chronic pain syndrome    -  1    Hypothyroidism, unspecified type        Overuse syndrome of mid back, subsequent encounter        Incomplete tear of right rotator cuff        AC (acromioclavicular) joint arthritis          Care Instructions    Four month follow up with Dr. Martinez for establishing care    Luisito Huang MD            Follow-ups after your visit        Follow-up notes from your care team     Return in about 4 months (around 1/28/2019).      Who to contact     If you have questions or need follow up information about today's clinic visit or your schedule please [facility] at [phone].  Normal or non-critical lab and imaging results will be communicated to you by MyChart, letter or phone within 4 business days after the clinic has received the results. If you do not hear from us within 7 days, please contact the clinic through MyChart or phone. If you have a critical or abnormal lab result, we will notify you by phone as soon as possible.  Submit refill requests through Zursh or call your pharmacy and they will forward the refill request to us. Please allow 3 business days for your refill to be completed.          Additional Information About Your Visit        MyChart Information     Zursh lets you send messages to your doctor, view your test results, renew your prescriptions, schedule appointments and more. To sign up, go to [email or web address] . Click on \"Log in\" on the left side of the screen, which will take you to the Welcome page. Then click on \"Sign up Now\" on the right side of the page.     You will be asked to enter the access code listed below, as " well as some personal information. Please follow the directions to create your username and password.     Your access code is: [email or web address]  Expires: 2018 12:05 PM     Your access code will  in 90 days. If you need help or a new code, please call your [place] clinic or [phone].        Care EveryWhere ID     This is your Care EveryWhere ID. This could be used by other organizations to access your [place] medical records  [id number]        Your Vitals Were     Pulse Temperature Respirations Height Pulse Oximetry BMI (Body Mass Index)    81 98.3  F (36.8  C) (Temporal) 18 6' (1.829 m) 96% 33.97 kg/m2       Blood Pressure from Last 3 Encounters:   18 118/78   18 122/80   17 112/80    Weight from Last 3 Encounters:   18 250 lb 8 oz (113.6 kg)   18 244 lb (110.7 kg)   17 246 lb (111.6 kg)              We Performed the Following     Anti thyroglobulin antibody     [id number] - Pain Drug Screen, Urine, with reported meds (MedTox)     T4, free     Thyroid peroxidase antibody     TSH          Where to get your medicines      These medications were sent to [facility] [address] [place] [address]     Phone:  [phone]     cyclobenzaprine 10 MG tablet    diclofenac 75 MG EC tablet         Some of these will need a paper prescription and others can be bought over the counter.  Ask your nurse if you have questions.     Bring a paper prescription for each of these medications     traMADol 50 MG tablet         Information about OPIOIDS     PRESCRIPTION OPIOIDS: WHAT YOU NEED TO KNOW   We gave you an opioid (narcotic) pain medicine. It is important to manage your pain, but opioids are not always the best choice. You should first try all the other options your care team gave you. Take this medicine for as short a time (and as few doses) as possible.    Some activities can increase your pain, such as bandage changes or  therapy sessions. It may help to take your pain medicine 30 to 60 minutes before these activities. Reduce your stress by getting enough sleep, working on hobbies you enjoy and practicing relaxation or meditation. Talk to your care team about ways to manage your pain beyond prescription opioids.    These medicines have risks:    DO NOT drive when on new or higher doses of pain medicine. These medicines can affect your alertness and reaction times, and you could be arrested for driving under the influence (DUI). If you need to use opioids long-term, talk to your care team about driving.    DO NOT operate heavy machinery    DO NOT do any other dangerous activities while taking these medicines.    DO NOT drink any alcohol while taking these medicines.     If the opioid prescribed includes acetaminophen, DO NOT take with any other medicines that contain acetaminophen. Read all labels carefully. Look for the word  acetaminophen  or  Tylenol.  Ask your pharmacist if you have questions or are unsure.    You can get addicted to pain medicines, especially if you have a history of addiction (chemical, alcohol or substance dependence). Talk to your care team about ways to reduce this risk.    All opioids tend to cause constipation. Drink plenty of water and eat foods that have a lot of fiber, such as fruits, vegetables, prune juice, apple juice and high-fiber cereal. Take a laxative (Miralax, milk of magnesia, Colace, Senna) if you don t move your bowels at least every other day. Other side effects include upset stomach, sleepiness, dizziness, throwing up, tolerance (needing more of the medicine to have the same effect), physical dependence and slowed breathing.    Store your pills in a secure place, locked if possible. We will not replace any lost or stolen medicine. If you don t finish your medicine, please throw away (dispose) as directed by your pharmacist. The [place] Pollution Control Agency has more information about  safe disposal: https://www.pca.[facility].mn.us/living-green/managing-unwanted-medications         Primary Care Provider Office Phone # Fax #    Luisito Huang [phone] [address] [place] [address]        Equal Access to Services     GUERLINE PATEL : Hadii aad ku hadasho Soomaali, waaxda luqadaha, qaybta kaalmada adeegyada, waxay idiin hayaan adeeg maranda laYurianoopn . So [facility] [phone].    ATENCIÓN: Si habla español, tiene a nur disposición servicios gratuitos de asistencia lingüística. Llame al [phone].    We comply with applicable federal civil rights laws and [place] laws. We do not discriminate on the basis of race, color, national origin, age, disability, sex, sexual orientation, or gender identity.            Thank you!     Thank you for choosing [facility]  for your care. Our goal is always to provide you with excellent care. Hearing back from our patients is one way we can continue to improve our services. Please take a few minutes to complete the written survey that you may receive in the mail after your visit with us. Thank you!             Your Updated Medication List - Protect others around you: Learn how to safely use, store and throw away your medicines at www.disposemymeds.org.          This list is accurate as of 9/28/18 12:05 PM.  Always use your most recent med list.                   Brand Name Dispense Instructions for use Diagnosis    cyclobenzaprine 10 MG tablet    FLEXERIL    30 tablet    Take 1 tablet (10 mg) by mouth every 12 hours as needed for muscle spasms    Overuse syndrome of mid back, subsequent encounter       DAILY MULTIVITAMIN PO      Take 1 tablet by mouth daily Reported on 5/12/2017        diclofenac 75 MG EC tablet    VOLTAREN    60 tablet    Take 1 tablet (75 mg) by mouth 2 times daily    Incomplete tear of right rotator cuff, AC (acromioclavicular) joint arthritis, Chronic pain syndrome       Ginkgo Biloba 40 MG Tabs       Take 500 mg by mouth daily        levothyroxine 50 MCG tablet    SYNTHROID/LEVOTHROID    90 tablet    Take 1 tablet (50 mcg) by mouth daily    Hypothyroidism, unspecified type       LYSINE      1 tablet daily.        NEW MED      Take dietary tablet daily before meal.  Name of the medication: Rosales        traMADol 50 MG tablet    ULTRAM    80 tablet    Take 1 tablet (50 mg) by mouth every 6 hours as needed for pain Expected to last 4 months.    AC (acromioclavicular) joint arthritis, Chronic pain syndrome       ZANTAC PO      Take 150 mg by mouth daily

## 2018-09-28 NOTE — LETTER
October 1, 2018      Sukhdeep Wolf  [address]  [place] [address]              Dear Cecil,    The two thyroid antibodies have been completed and the thyroid peroxidase antibody is distinctly positive, indicating that your hypothyroidism is from chronic autoimmune thyroiditis or Hashimoto's disease.  This is what was anticipated given your findings.      Please call with any questions or concerns and thank you for your confidence.          Sincerely,      Luisito Huang MD

## 2018-10-01 ENCOUNTER — TELEPHONE (OUTPATIENT)
Dept: FAMILY MEDICINE | Facility: CLINIC | Age: 48
End: 2018-10-01

## 2018-10-01 DIAGNOSIS — E03.9 HYPOTHYROIDISM, UNSPECIFIED TYPE: Primary | ICD-10-CM

## 2018-10-01 LAB
THYROGLOB AB SERPL IA-ACNC: <20 IU/ML (ref 0–40)
THYROPEROXIDASE AB SERPL-ACNC: 883 IU/ML

## 2018-10-01 RX ORDER — LEVOTHYROXINE SODIUM 100 UG/1
100 TABLET ORAL DAILY
Qty: 90 TABLET | Refills: 1 | Status: SHIPPED | OUTPATIENT
Start: 2018-10-01 | End: 2018-11-08

## 2018-10-01 NOTE — TELEPHONE ENCOUNTER
Patient to be called and informed of the followin. The TSH is still abnormal and indicating a need for increased doses of Levothyroxine as expected.    2. A new prescription for Levothyroxine 100 mcg has been sent to his pharmacy -- increasing the replacement thyroid dose.    3. He can use the remaining supply of 50 mcg Levothyroxine by taking two tablets daily till the supply runs out and then begin the new tablets.    4. He should schedule a lab only visit for non-fasting blood work to recheck his thyroid status in early November--suggesting 2018.    5. There are additional blood tests for evaluating the cause of the hypothyroidism still pending and he will be informed when they are available.    Assist in scheduling as needed.    Luisito Huang MD

## 2018-10-01 NOTE — TELEPHONE ENCOUNTER
Please inform that the prescription sent from here was for 80 tablets. He should request an explanation from the pharmacy for why only 40 tablets were dispensed.    Check with the pharmacy to see that they received the 9/28/2018 prescription for 80 tablets of Tramadol.    Luisito Huang MD  Please close encounter when call/work completed.

## 2018-10-01 NOTE — TELEPHONE ENCOUNTER
Patient informed. Also patient stated he only got 40 tablets of the Tramadol instead of 80?     Pharmacy - [facility].

## 2018-10-04 LAB — PAIN DRUG SCR UR W RPTD MEDS: NORMAL

## 2018-10-25 ENCOUNTER — OFFICE VISIT (OUTPATIENT)
Dept: FAMILY MEDICINE | Facility: CLINIC | Age: 48
End: 2018-10-25
Payer: COMMERCIAL

## 2018-10-25 ENCOUNTER — RADIANT APPOINTMENT (OUTPATIENT)
Dept: GENERAL RADIOLOGY | Facility: CLINIC | Age: 48
End: 2018-10-25
Attending: FAMILY MEDICINE
Payer: COMMERCIAL

## 2018-10-25 VITALS
WEIGHT: 252 LBS | DIASTOLIC BLOOD PRESSURE: 80 MMHG | SYSTOLIC BLOOD PRESSURE: 100 MMHG | OXYGEN SATURATION: 97 % | TEMPERATURE: 98.1 F | BODY MASS INDEX: 34.13 KG/M2 | HEIGHT: 72 IN | HEART RATE: 72 BPM | RESPIRATION RATE: 18 BRPM

## 2018-10-25 DIAGNOSIS — J22 LOWER RESPIRATORY TRACT INFECTION: Primary | ICD-10-CM

## 2018-10-25 DIAGNOSIS — J22 LOWER RESPIRATORY TRACT INFECTION: ICD-10-CM

## 2018-10-25 PROCEDURE — 99213 OFFICE O/P EST LOW 20 MIN: CPT | Performed by: FAMILY MEDICINE

## 2018-10-25 PROCEDURE — 71046 X-RAY EXAM CHEST 2 VIEWS: CPT | Mod: FY

## 2018-10-25 RX ORDER — CEFUROXIME AXETIL 250 MG/1
500 TABLET ORAL 2 TIMES DAILY
Qty: 40 TABLET | Refills: 0 | Status: SHIPPED | OUTPATIENT
Start: 2018-10-25 | End: 2019-01-15

## 2018-10-25 ASSESSMENT — PATIENT HEALTH QUESTIONNAIRE - PHQ9
SUM OF ALL RESPONSES TO PHQ QUESTIONS 1-9: 0
5. POOR APPETITE OR OVEREATING: NOT AT ALL

## 2018-10-25 ASSESSMENT — ANXIETY QUESTIONNAIRES
1. FEELING NERVOUS, ANXIOUS, OR ON EDGE: NOT AT ALL
3. WORRYING TOO MUCH ABOUT DIFFERENT THINGS: NOT AT ALL
2. NOT BEING ABLE TO STOP OR CONTROL WORRYING: NOT AT ALL
GAD7 TOTAL SCORE: 0
5. BEING SO RESTLESS THAT IT IS HARD TO SIT STILL: NOT AT ALL
IF YOU CHECKED OFF ANY PROBLEMS ON THIS QUESTIONNAIRE, HOW DIFFICULT HAVE THESE PROBLEMS MADE IT FOR YOU TO DO YOUR WORK, TAKE CARE OF THINGS AT HOME, OR GET ALONG WITH OTHER PEOPLE: NOT DIFFICULT AT ALL
7. FEELING AFRAID AS IF SOMETHING AWFUL MIGHT HAPPEN: NOT AT ALL
6. BECOMING EASILY ANNOYED OR IRRITABLE: NOT AT ALL

## 2018-10-25 NOTE — LETTER
October 25, 2018      RE: Sukhdeep Wolf  [address]  [place] [facility] [place] [address]       To whom it may concern:    Sukhdeep Wolf was seen in our clinic today. He  may return to work with the following: No restrictions on or about 10/29/2018.     Work days missed: 10/15/2018, 10/25/2018, 10/26/2018    Sincerely,      Luisito Huang MD

## 2018-10-25 NOTE — PROGRESS NOTES
"  SUBJECTIVE:   Sukhdeep Wolf is a 48 year old male who presents to clinic today for the following health issues:      HPI  Acute Illness--     Ill for the past 2-3 weeks with persistent productive cough with possibly low grade fever at times. Some variable levels of a sore throat noted. No major sinus pain or pressure. Definite malaise noted. Has missed some work due to symptoms.    Initial symptoms began 2+ weeks ago and did slowly improve to some degree until 4 days ago. Symptoms have increased over the past 4 days with feverish feelings and increasing purulent mucus. No major dyspnea but does have some increase in general mid back pain.    Does indicate that the symptoms began the day after receiving the seasonal influenza vaccination.   Acute illness concerns: poss sickness from flu vaccine   Onset: 10/13/2018 - flu shot done at work    \" My lungs feel very delicate\"     Fever: YES, this morning 99.5     Chills/Sweats: YES, sweats     Headache (location?): YES    Sinus Pressure:YES    Conjunctivitis:  no    Ear Pain: no    Rhinorrhea: no     Congestion: YES    Sore Throat: YES     Cough: YES-productive of yellow sputum    Wheeze: no     Decreased Appetite: no     Nausea: YES, here and there     Vomiting: no     Diarrhea:  no     Dysuria/Freq.: no     Fatigue/Achiness: YES    Sick/Strep Exposure: YES, works at [employer]      Therapies Tried and outcome: Nyquil , Robitussin , cough drops      Problem list and histories reviewed & adjusted, as indicated.  Additional history: as documented        BP Readings from Last 3 Encounters:   10/25/18 100/80   09/28/18 118/78   02/21/18 122/80    Wt Readings from Last 3 Encounters:   10/25/18 252 lb (114.3 kg)   09/28/18 250 lb 8 oz (113.6 kg)   02/21/18 244 lb (110.7 kg)                    ROS:   ROS: 10 point ROS neg other than the symptoms noted above in the HPI.      OBJECTIVE:                                                    /80  Pulse " 72  Temp 98.1  F (36.7  C) (Temporal)  Resp 18  Ht 6' (1.829 m)  Wt 252 lb (114.3 kg)  SpO2 97%  BMI 34.18 kg/m2  Body mass index is 34.18 kg/(m^2).  GENERAL: alert, active, no distress and cooperative  EYES: Eyes grossly normal to inspection, extraocular movements - intact, and PERRL  HENT: ear canals and TM's normal and some redness of the oral pharynx without oral exudates and without ulcers or soft tissue edema.  NECK: no tenderness, no adenopathy, no asymmetry, no masses, no stiffness; thyroid- normal to palpation  RESP: lungs clear to auscultation - no rales, no rhonchi, no wheezes  CV: regular rates and rhythm, normal S1 S2, no S3 or S4 and no murmur, no click or rub -  ABDOMEN: soft, no tenderness, no  hepatosplenomegaly, no masses, normal bowel sounds  MS: extremities- no gross deformities noted, no edema  SKIN: no suspicious lesions, no rashes  NEURO: strength and tone- normal, sensory exam- grossly normal, mentation- intact, speech- normal, reflexes- symmetric  PSYCH: Alert and oriented times 3; speech- coherent , normal rate and volume; able to articulate logical thoughts, able to abstract reason, no tangential thoughts, no hallucinations or delusions, affect- normal    Diagnostic test results:  Diagnostic Test Results:    Chest x-ray appears clear of infiltrates     ASSESSMENT/PLAN:                                                    1. Lower respiratory tract infection  Suspect persistent tracheobronchitis--will treat empirically with Ceftin 500 mg twice daily for ten days.  - XR Chest 2 Views; Future  - cefuroxime (CEFTIN) 250 MG tablet; Take 2 tablets (500 mg) by mouth 2 times daily  Dispense: 40 tablet; Refill: 0      Follow up with Provider - 11/6/2018 as planned for thyroid follow up.   See Patient Instructions    The patient understood the rational for the diagnosis and treatment plan. All questions were answered to best of my ability and the patient's satisfaction.        Luisito Huang,  MD  [facility]

## 2018-10-25 NOTE — MR AVS SNAPSHOT
After Visit Summary   10/25/2018    Sukhdeep Wolf    MRN: [id number]           Patient Information     Date Of Birth          1970        Visit Information        Provider Department      10/25/2018 8:40 AM Luisito Huang MD [facility]        Today's Diagnoses     Lower respiratory tract infection    -  1       Follow-ups after your visit        Follow-up notes from your care team     Return in about 3 months (around 1/25/2019) for Routine Visit.      Your next 10 appointments already scheduled     Nov 07, 2018  2:00 PM CST   LAB with RG LAB   [facility] ([facility])    [address]  [place] [address]   [phone]           Please do not eat 10-12 hours before your appointment if you are coming in fasting for labs on lipids, cholesterol, or glucose (sugar). This does not apply to pregnant women. Water, hot tea and black coffee (with nothing added) are okay. Do not drink other fluids, diet soda or chew gum.            Feb 01, 2019  1:00 PM CST   Office Visit with Lexi Martinez MD   [facility] ([facility])    [address], [address]  [place] [id number] [phone]           Bring a current list of meds and any records pertaining to this visit. For Physicals, please bring immunization records and any forms needing to be filled out. Please arrive 10 minutes early to complete paperwork.              Who to contact     If you have questions or need follow up information about today's clinic visit or your schedule please contact [facility] directly at [phone].  Normal or non-critical lab and imaging results will be communicated to you by MyChart, letter or phone within 4 business days after the clinic has received the results. If you do not hear from us within 7 days, please contact the clinic through MyChart or phone. If you have a critical or abnormal lab result,  we will notify you by phone as soon as possible.  Submit refill requests through [employer] or call your pharmacy and they will forward the refill request to us. Please allow 3 business days for your refill to be completed.          Additional Information About Your Visit        [employer] Information     [employer] gives you secure access to your electronic health record. If you see a primary care provider, you can also send messages to your care team and make appointments. If you have questions, please call your primary care clinic.  If you do not have a primary care provider, please call [phone] and they will assist you.        Care EveryWhere ID     This is your Care EveryWhere ID. This could be used by other organizations to access your [facility] medical records  [id number]        Your Vitals Were     Pulse Temperature Respirations Height Pulse Oximetry BMI (Body Mass Index)    72 98.1  F (36.7  C) (Temporal) 18 6' (1.829 m) 97% 34.18 kg/m2       Blood Pressure from Last 3 Encounters:   10/25/18 100/80   09/28/18 118/78   02/21/18 122/80    Weight from Last 3 Encounters:   10/25/18 252 lb (114.3 kg)   09/28/18 250 lb 8 oz (113.6 kg)   02/21/18 244 lb (110.7 kg)                 Today's Medication Changes          These changes are accurate as of 10/25/18  9:26 AM.  If you have any questions, ask your nurse or doctor.               Start taking these medicines.        Dose/Directions    cefuroxime 250 MG tablet   Commonly known as:  CEFTIN   Used for:  Lower respiratory tract infection   Started by:  Luisito Huang MD        Dose:  500 mg   Take 2 tablets (500 mg) by mouth 2 times daily   Quantity:  40 tablet   Refills:  0            Where to get your medicines      These medications were sent to [facility] [address] [place] [id number]     Phone:  [phone]     cefuroxime 250 MG tablet                Primary Care Provider Office Phone # Fax #     Luisito Huang [phone]       [address] [facility] [address]        Equal Access to Services     GUERLINE PATEL : Hadbharati ariel tillman binta Sotalia, wahectorda luqadaha, qaybta kaalmada nathaly, maira low lajustinfatimah shawn. So [facility] [phone].    ATENCIÓN: Si habla español, tiene a nur disposición servicios gratuitos de asistencia lingüística. Llame al [phone].    We comply with applicable federal civil rights laws and [place] laws. We do not discriminate on the basis of race, color, national origin, age, disability, sex, sexual orientation, or gender identity.            Thank you!     Thank you for choosing [facility]  for your care. Our goal is always to provide you with excellent care. Hearing back from our patients is one way we can continue to improve our services. Please take a few minutes to complete the written survey that you may receive in the mail after your visit with us. Thank you!             Your Updated Medication List - Protect others around you: Learn how to safely use, store and throw away your medicines at www.disposemymeds.org.          This list is accurate as of 10/25/18  9:26 AM.  Always use your most recent med list.                   Brand Name Dispense Instructions for use Diagnosis    cefuroxime 250 MG tablet    CEFTIN    40 tablet    Take 2 tablets (500 mg) by mouth 2 times daily    Lower respiratory tract infection       cyclobenzaprine 10 MG tablet    FLEXERIL    30 tablet    Take 1 tablet (10 mg) by mouth every 12 hours as needed for muscle spasms    Overuse syndrome of mid back, subsequent encounter       DAILY MULTIVITAMIN PO      Take 1 tablet by mouth daily Reported on 5/12/2017        diclofenac 75 MG EC tablet    VOLTAREN    60 tablet    Take 1 tablet (75 mg) by mouth 2 times daily    Incomplete tear of right rotator cuff, AC (acromioclavicular) joint arthritis, Chronic pain syndrome       Ginkgo Biloba 40 MG Tabs       Take 500 mg by mouth daily        levothyroxine 100 MCG tablet    SYNTHROID/LEVOTHROID    90 tablet    Take 1 tablet (100 mcg) by mouth daily    Hypothyroidism, unspecified type       LYSINE      1 tablet daily.        NEW MED      Take dietary tablet daily before meal.  Name of the medication: Rosales        traMADol 50 MG tablet    ULTRAM    80 tablet    Take 1 tablet (50 mg) by mouth every 6 hours as needed for pain Expected to last 4 months.    AC (acromioclavicular) joint arthritis, Chronic pain syndrome       ZANTAC PO      Take 150 mg by mouth daily

## 2018-10-26 ASSESSMENT — ANXIETY QUESTIONNAIRES: GAD7 TOTAL SCORE: 0

## 2018-11-07 DIAGNOSIS — E03.9 HYPOTHYROIDISM, UNSPECIFIED TYPE: ICD-10-CM

## 2018-11-07 LAB
T4 FREE SERPL-MCNC: 1.48 NG/DL (ref 0.76–1.46)
TSH SERPL DL<=0.005 MIU/L-ACNC: 1.95 MU/L (ref 0.4–4)

## 2018-11-07 PROCEDURE — 84439 ASSAY OF FREE THYROXINE: CPT | Performed by: FAMILY MEDICINE

## 2018-11-07 PROCEDURE — 36415 COLL VENOUS BLD VENIPUNCTURE: CPT | Performed by: FAMILY MEDICINE

## 2018-11-07 PROCEDURE — 84443 ASSAY THYROID STIM HORMONE: CPT | Performed by: FAMILY MEDICINE

## 2018-11-07 NOTE — LETTER
November 8, 2018      Sukhdeep Wolf  [address]  [place] [address]        Dear Elisabeth Jacob is a copy of your recent thyroid tests.  The TSH is now normal but the actual thyroid hormone level (T4) is slightly above the normal range, indicating that the current dose of Levothyroxine 100 mcg may be too high of a dose for replacement.     With the current laboratory studies, I have included a new prescription for a slightly lower dose of Levothyroxine at 88 mcg daily instead of 100 mcg daily.  The goal is to have both the TSH and T4 in the normal range.    I would ask that you have the thyroid studies rechecked when you return in late January/early February 2019 for the 4-month follow-up.  At that time, further adjustments may need to be made.  We do want to avoid over supplementing the thyroid hormone and cause a hyperthyroid state.    Please call with any questions or concerns and thank you for your confidence.          Sincerely,      Luisito Huang MD

## 2018-11-08 PROBLEM — E06.3 CHRONIC AUTOIMMUNE THYROIDITIS: Status: ACTIVE | Noted: 2018-11-08

## 2018-11-08 PROBLEM — E03.9 HYPOTHYROIDISM, UNSPECIFIED TYPE: Status: RESOLVED | Noted: 2018-02-21 | Resolved: 2018-11-08

## 2018-11-08 RX ORDER — LEVOTHYROXINE SODIUM 88 UG/1
88 TABLET ORAL DAILY
Qty: 90 TABLET | Refills: 1 | Status: SHIPPED | OUTPATIENT
Start: 2018-11-08 | End: 2019-01-15

## 2018-11-08 NOTE — PROGRESS NOTES
The patient's repeat thyroid studies 5 weeks after adjusting the dose of Levothyroxine to 100 mcg daily show a normal TSH but an elevated T4 at 1.48.    Plan is to reduce Levothyroxine to 88 mcg daily and to recheck TSH and T4 in late January/early February 2019 with his 4-month follow-up for medication management.    Results of the laboratory studies, cover letter and a new prescription was sent to the patient as it does not appear that my chart messages are reviewed.    Luisito Huang MD

## 2019-01-15 ENCOUNTER — OFFICE VISIT (OUTPATIENT)
Dept: FAMILY MEDICINE | Facility: CLINIC | Age: 49
End: 2019-01-15
Payer: COMMERCIAL

## 2019-01-15 ENCOUNTER — TELEPHONE (OUTPATIENT)
Dept: FAMILY MEDICINE | Facility: CLINIC | Age: 49
End: 2019-01-15

## 2019-01-15 VITALS
SYSTOLIC BLOOD PRESSURE: 116 MMHG | BODY MASS INDEX: 34.24 KG/M2 | DIASTOLIC BLOOD PRESSURE: 84 MMHG | HEART RATE: 82 BPM | RESPIRATION RATE: 16 BRPM | WEIGHT: 252.8 LBS | TEMPERATURE: 97.5 F | OXYGEN SATURATION: 98 % | HEIGHT: 72 IN

## 2019-01-15 DIAGNOSIS — G89.4 CHRONIC PAIN SYNDROME: ICD-10-CM

## 2019-01-15 DIAGNOSIS — J11.1 INFLUENZA-LIKE ILLNESS: ICD-10-CM

## 2019-01-15 DIAGNOSIS — E03.9 HYPOTHYROIDISM, UNSPECIFIED TYPE: Primary | ICD-10-CM

## 2019-01-15 LAB
FLUAV+FLUBV AG SPEC QL: NEGATIVE
FLUAV+FLUBV AG SPEC QL: NEGATIVE
SPECIMEN SOURCE: NORMAL
TSH SERPL DL<=0.005 MIU/L-ACNC: 3.7 MU/L (ref 0.4–4)

## 2019-01-15 PROCEDURE — 36415 COLL VENOUS BLD VENIPUNCTURE: CPT | Performed by: NURSE PRACTITIONER

## 2019-01-15 PROCEDURE — 87804 INFLUENZA ASSAY W/OPTIC: CPT | Mod: 59 | Performed by: NURSE PRACTITIONER

## 2019-01-15 PROCEDURE — 84443 ASSAY THYROID STIM HORMONE: CPT | Performed by: NURSE PRACTITIONER

## 2019-01-15 PROCEDURE — 99214 OFFICE O/P EST MOD 30 MIN: CPT | Performed by: NURSE PRACTITIONER

## 2019-01-15 RX ORDER — TRAMADOL HYDROCHLORIDE 50 MG/1
50 TABLET ORAL EVERY 6 HOURS PRN
Qty: 20 TABLET | Refills: 0 | Status: SHIPPED | OUTPATIENT
Start: 2019-01-15 | End: 2019-01-15

## 2019-01-15 RX ORDER — TRAMADOL HYDROCHLORIDE 50 MG/1
50 TABLET ORAL EVERY 6 HOURS PRN
Qty: 30 TABLET | Refills: 0 | Status: SHIPPED | OUTPATIENT
Start: 2019-01-15 | End: 2019-09-13

## 2019-01-15 RX ORDER — LEVOTHYROXINE SODIUM 88 UG/1
88 TABLET ORAL DAILY
Qty: 90 TABLET | Refills: 1 | Status: SHIPPED | OUTPATIENT
Start: 2019-01-15 | End: 2019-08-22

## 2019-01-15 ASSESSMENT — MIFFLIN-ST. JEOR: SCORE: 2054.69

## 2019-01-15 ASSESSMENT — PAIN SCALES - GENERAL: PAINLEVEL: MILD PAIN (2)

## 2019-01-15 NOTE — LETTER
January 15, 2019      Sukhdeep Wolf  [address]  [place] [address]        To Whom It May Concern:    The above patient is being treated for influenza-like illness. They should not be at work or school until they are fever-free for 24 hours without the aid of medications.        Sincerely,        RENETTA Mueller CNP

## 2019-01-15 NOTE — TELEPHONE ENCOUNTER
Returned call to patient to discuss. Patient states that his temperature has been fluctuating between 98 and 100.2. Advised patient of factors that may affect temperature reading. Educated patient to avoid checking temperature for 10-15 minutes after eating or drinking anything. Educated patient that a temperature of 100.4 and above would be considered a fever. Patient will continue to monitor. Charla Ontiveros RN

## 2019-01-15 NOTE — TELEPHONE ENCOUNTER
Reason for Call:  Other question    Detailed comments: Patient states he was told not to return to work until he is fever free. Patient states his temperature has been going from 98 - 102 and he wants to know if this is ok to go to work tomorrow or would he still be contagious?      Phone Number Patient can be reached at: Home number on file [phone] (home) or Cell number on file:    Telephone Information:   Mobile [phone]       Best Time: anytime    Can we leave a detailed message on this number? YES    Call taken on 1/15/2019 at 2:32 PM by Aide Oh

## 2019-01-15 NOTE — PROGRESS NOTES
"  SUBJECTIVE:   Sukhdeep Wolf is a 48 year old male who presents to clinic today for the following health issues:    HPI  Acute Illness   Acute illness concerns: 3 days   Onset: cough     Fever: YES, last night 100.3     Chills/Sweats: YES    Headache (location?): YES    Sinus Pressure:no    Conjunctivitis:  no    Ear Pain: no    Rhinorrhea: YES    Congestion: no     Sore Throat: YES     Cough: YES, deep cough     Wheeze: no     Decreased Appetite: YES    Nausea: no     Vomiting: no     Diarrhea:  no     Dysuria/Freq.: YES, frequent urination     Fatigue/Achiness: YES, both     Sick/Strep Exposure: no      Therapies Tried and outcome: generic Nyquil syrup     Hypothyroidism Follow-up  Since last visit, patient describes the following symptoms: Weight stable, no hair loss, no skin changes, no constipation, no loose stools    Chronic Pain Follow-Up- Noted partial rotator cuff tear X 2 1/2 years. On work restrictions, and occ. Pain meds. States taking Ultram twice/week.   Type / Location of Pain: Right Shoulder   Analgesia/pain control:       Recent changes:  same      Overall control: Tolerable with discomfort  Activity level/function:      Daily activities:  Able to do all daily activities    Work:  Works Full time   Adverse effects:  No  Adherance    Taking medication as directed?  Yes    Participating in other treatments: none  Risk Factors:    Sleep:  \"moderate\"     Mood/anxiety:  controlled    Recent family or social stressors:  none noted    Other aggravating factors: excessive use of arm at work , heavy lifting     PHQ-9 SCORE 1/11/2017 9/28/2018 10/25/2018   PHQ-9 Total Score MyChart - Incomplete -   PHQ-9 Total Score 0 - 0     YOANA-7 SCORE 1/11/2017 10/25/2018   Total Score 0 0     Encounter-Level CSA - 01/11/2017:    Controlled Substance Agreement - Scan on 1/24/2017 12:00 PM: CONTROLLED SUBSTANCE AGREEMENT (below)       Patient-Level CSA:    There are no patient-level csa.        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 He has had a job restriction note which he needs renewed. Dr. Huang has not recommended that he gets a surgery,  because his pain is not worsening. 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 He is taking Tramadol twice a week and some weeks he doesn't take any. He takes Tramadol and Voltaren at the same time as needed. His chronic pain is not restricting him to do his daily activities. He has not tried Tylenol or Ibuprofen, because when he gets the pain it is severe enough for him to take Tramadol. Not routinely taking Voltaren.   Denies YOANA and depression symptoms.     Sore Throat/Cough  He has a sore throat, coughing, denies being congestion in his sinuses, he has been using Nyquil syrup . Reports fatigue and loss of energy. He has not increased his fluids, however has been urinating more frequently. Denies pain with urination. He did not take any fever reducing medications today.    Problem list and histories reviewed & adjusted, as indicated.  Additional history: as documented    Patient Active Problem List   Diagnosis     CARDIOVASCULAR SCREENING; LDL GOAL LESS THAN 160     Intracranial arachnoid cyst     Lipoma of skin and subcutaneous tissue     Scalp mass     Scalp lesion     Lateral epicondylitis     Overuse syndrome of mid back, subsequent encounter     AC (acromioclavicular) joint arthritis     Chronic pain syndrome     Incomplete tear of right rotator cuff     Chronic autoimmune thyroiditis     Past Surgical History:   Procedure Laterality Date     EXCISE MASS HEAD  8/19/2013    Procedure: EXCISE MASS HEAD;  Resection Of Right Scalp Mass ;  Surgeon: Anson Pinedo MD;  Location:  OR       Social History     Tobacco Use     Smoking status: Former Smoker     Packs/day: 0.50     Years: 27.00     Pack years: 13.50     Types: Other     Last attempt  to quit: 2013     Years since quittin.7     Smokeless tobacco: Never Used     Tobacco comment: E-cig user everyday -- Off cigarettes   Substance Use Topics     Alcohol use: Yes     Alcohol/week: 1.0 oz     Types: 2 Standard drinks or equivalent per week     Comment: 2-3 drinks a month      Family History   Problem Relation Age of Onset     Cerebrovascular Disease Maternal Grandfather      Cardiovascular Paternal Grandfather      Heart Disease Paternal Grandfather      Diabetes Paternal Grandfather      Myocardial Infarction Father      Asthma No family hx of      C.A.D. No family hx of      Hypertension No family hx of      Breast Cancer No family hx of      Cancer - colorectal No family hx of      Prostate Cancer No family hx of      Cancer No family hx of      Blood Disease No family hx of      Arthritis No family hx of      Alzheimer Disease No family hx of      Circulatory No family hx of      Eye Disorder No family hx of      Gastrointestinal Disease No family hx of      Genitourinary Problems No family hx of      Lipids No family hx of      Musculoskeletal Disorder No family hx of      Respiratory No family hx of      Thyroid Disease No family hx of      Neurologic Disorder No family hx of      Coronary Artery Disease No family hx of      Hyperlipidemia No family hx of      Ovarian Cancer No family hx of      Depression/Anxiety No family hx of      Anesthesia Reaction No family hx of      Thyroid Disease No family hx of      Osteoporosis No family hx of      Chemical Addiction No family hx of      Known Genetic Syndrome No family hx of      Depression No family hx of      Anxiety Disorder No family hx of      Mental Illness No family hx of      Substance Abuse No family hx of      Other Cancer No family hx of      Genetic Disorder No family hx of          Current Outpatient Medications   Medication Sig Dispense Refill     diclofenac (VOLTAREN) 75 MG EC tablet Take 1 tablet (75 mg) by mouth 2 times  daily 60 tablet 11     Ginkgo Biloba 40 MG TABS Take 500 mg by mouth daily        levothyroxine (SYNTHROID/LEVOTHROID) 88 MCG tablet Take 1 tablet (88 mcg) by mouth daily 90 tablet 1     LYSINE 1 tablet daily.       RaNITidine HCl (ZANTAC PO) Take 150 mg by mouth daily       traMADol (ULTRAM) 50 MG tablet Take 1 tablet (50 mg) by mouth every 6 hours as needed for severe pain 30 tablet 0     cyclobenzaprine (FLEXERIL) 10 MG tablet Take 1 tablet (10 mg) by mouth every 12 hours as needed for muscle spasms (Patient not taking: Reported on 1/15/2019) 30 tablet 2     Multiple Vitamin (DAILY MULTIVITAMIN PO) Take 1 tablet by mouth daily Reported on 5/12/2017       NEW MED Take dietary tablet daily before meal.   Name of the medication: Rosales       No Known Allergies  Recent Labs   Lab Test 01/15/19  1133 11/07/18  1359  02/21/18  1213 03/11/16  1210  07/01/13  1336   LDL  --   --   --   --  99  --  112   HDL  --   --   --   --  37*  --  38*   TRIG  --   --   --   --  68  --  56   CR  --   --   --  0.97 1.20  --  1.04   GFRESTIMATED  --   --   --  83 65  --  78   GFRESTBLACK  --   --   --  >90 79  --  >90   POTASSIUM  --   --   --  3.7 3.9   < > 4.8   TSH 3.70 1.95   < > 9.36*  --   --   --     < > = values in this interval not displayed.      BP Readings from Last 3 Encounters:   01/15/19 116/84   10/25/18 100/80   09/28/18 118/78    Wt Readings from Last 3 Encounters:   01/15/19 114.7 kg (252 lb 12.8 oz)   10/25/18 114.3 kg (252 lb)   09/28/18 113.6 kg (250 lb 8 oz)         Labs reviewed in EPIC    ROS:  Constitutional, neuro, ENT, endocrine, pulmonary, cardiac, gastrointestinal, genitourinary, musculoskeletal, integument and psychiatric systems are negative, except as otherwise noted.    This document serves as a record of the services and decisions personally performed and made by Cammie Basurto CNP. It was created on his/her behalf by Lilian Esquivel, [facility] medical scribe. The creation of this document is based the  provider's statements to the medical scribes.    Greg Esquivel 11:23 AM, January 15, 2019    OBJECTIVE:                                                    /84   Pulse 82   Temp 97.5  F (36.4  C) (Temporal)   Resp 16   Ht 1.829 m (6')   Wt 114.7 kg (252 lb 12.8 oz)   SpO2 98%   BMI 34.29 kg/m    Body mass index is 34.29 kg/m .  GENERAL APPEARANCE: healthy, alert and no distress  HENT: ear canals and TM's normal and nose and mouth without ulcers or lesions  NECK: no adenopathy, no asymmetry, masses, or scars and thyroid normal to palpation  RESP: lungs clear to auscultation - no rales, rhonchi or wheezes  CV: regular rates and rhythm, normal S1 S2, no S3 or S4 and no murmur, click or rub  SKIN: no suspicious lesions or rashes  NEURO: Normal strength and tone, mentation intact and speech normal  PSYCH: mentation appears normal and affect normal/bright    Diagnostic test results:  none      ASSESSMENT/PLAN:                                                        ICD-10-CM    1. Hypothyroidism, unspecified type E03.9 levothyroxine (SYNTHROID/LEVOTHROID) 88 MCG tablet     TSH with free T4 reflex   2. Chronic pain syndrome G89.4 traMADol (ULTRAM) 50 MG tablet     DISCONTINUED: traMADol (ULTRAM) 50 MG tablet   3. Influenza-like illness R69 Influenza A/B antigen     Hypothyroidism- well controlled, refilled Synthroid 88 MCG, discussed patient's concerns about this medication.   Reviewed chronic rotator cuff pain, recommended he decreases the dose of Tramadol and starts using OTC pain killers as needed. Agreeable to 30 Q 6 months. As still an issue needs close follow-up with orthopedics, pt. Considering options.   Restriction note given for work.  Labs ordered today, will contact with results.   Discussed sore throat and cough, OK watchful waiting. Patient will call me if symptoms worsen or fail to improve.Discussed [facility] cares.   Pt. May call if symptoms persisting 4 more days.   Work note  given.    Follow up with Provider - in 6 months for rotator cuff evaluation.     The information in this document, created by the medical scribe for me, accurately reflects the services I personally performed and the decisions made by me. I have reviewed and approved this document for accuracy prior to leaving the patient care area.  Cammie Basurto CNP  11:22 AM, January 15, 2019    RENETTA Mueller [facility]

## 2019-01-15 NOTE — LETTER
January 15, 2019      Sukhdeep Wolf  [address]  [place] [address]        To Whom It May Concern:    Sukhdeep Wolf is under my professional care for Partial Right Rotator Cuff Tear.     He may return to work with the followin. He may continue to work in his current position     2. Recommend limiting his work week to 5 days per week, 8-10 hours per day to avoid injury.      3. Recommend having 2 consecutive days off to recover (Saturday and ), to avoid injury.     4. Recommending limiting the amount of resistance needed involving the right arm due to a partial rotator cuff tear--stable at present.        Sincerely,        RENETTA Mueller CNP

## 2019-08-22 DIAGNOSIS — E03.9 HYPOTHYROIDISM, UNSPECIFIED TYPE: ICD-10-CM

## 2019-08-22 RX ORDER — LEVOTHYROXINE SODIUM 88 UG/1
88 TABLET ORAL DAILY
Qty: 90 TABLET | Refills: 0 | Status: SHIPPED | OUTPATIENT
Start: 2019-08-22 | End: 2019-12-04

## 2019-08-22 NOTE — TELEPHONE ENCOUNTER
Reason for Call:  Medication or medication refill:    Do you use a [facility]?  Name of the pharmacy and phone number for the current request:  [facility] - [phone]    Name of the medication requested: thyroid medication    Other request: will need refill before his appt on 9-13. He has two left. Please call him when done.    Can we leave a detailed message on this number? YES    Phone number patient can be reached at: Home number on file [phone] (home)    Best Time: any    Call taken on 8/22/2019 at 5:04 PM by Sandra Bernstein

## 2019-08-23 NOTE — TELEPHONE ENCOUNTER
Pt informed.    Indiana Machuca, RN, BSN      Next 5 appointments (look out 90 days)    Sep 13, 2019  3:00 PM CDT  Office Visit with Lexi Martinez MD  [facility] ([facility]) [address], [address]  [place] [id number] [phone]

## 2019-09-10 NOTE — PROGRESS NOTES
Subjective     Sukhdeep Wolf is a 49 year old male who presents to clinic today for the following health issues:    History of Present Illness        Back Pain:  He presents for follow up of back pain. Patient's back pain is a chronic problem.  Location of back pain:  Right upper back, left upper back and right shoulder  Description of back pain: dull ache and sharp  Back pain spreads: nowhere    Since patient first noticed back pain, pain is: unchanged  Does back pain interfere with his job:  Yes      Hypothyroidism:     Since last visit, patient describes the following symptoms::  None    He eats 0-1 servings of fruits and vegetables daily.He consumes 1 sweetened beverage(s) daily.  He is taking medications regularly.     Hypothyroidism Follow-up      Since last visit, patient describes the following symptoms: loose stools    Shoulder and back Pain    Onset: for a few years    Description:   Location: right shoulder, and mid to upper back   Character: sore with over use then it becomes throbbing    Intensity: 3/10, in the back, no pain currently in shoulder    Progression of Symptoms: intermittent with shoulder, back is the same    Accompanying Signs & Symptoms:  Other symptoms: none    History:   Previous similar pain: YES      Precipitating factors:   Trauma or overuse: YES    Alleviating factors:  Improved by: rest/inactivity    Therapies Tried and outcome: see above    Right shoulder   Able to do his job. Unable to lift heavy objects without discomfort  Had been receiving a letter from Dr. Huang from his work for limitations.   Diclofenac taken when pain is worse. Not on a regular basis.   Tramadol is taken 1-3 times per month. Uses about 30 in 6 months.   Last dose has been 2 weeks ago of tramadol and was probably 1-2 weeks for diclofenac.   No muscle relaxant.     Back pain  Related to late teen early 20s pushed through dry wall and fell on the stairs.   Mid to upper back.   Comes and goes. Rare issue.    Uses diclofenac and tramadol prn. See above.     Has used CBD oil for the last week - has vaped it.       Patient Active Problem List   Diagnosis     CARDIOVASCULAR SCREENING; LDL GOAL LESS THAN 160     Intracranial arachnoid cyst     Lipoma of skin and subcutaneous tissue     Scalp mass     Scalp lesion     Lateral epicondylitis     Overuse syndrome of mid back, subsequent encounter     AC (acromioclavicular) joint arthritis     Chronic pain syndrome     Incomplete tear of right rotator cuff     Chronic autoimmune thyroiditis     Past Surgical History:   Procedure Laterality Date     EXCISE MASS HEAD  2013    Procedure: EXCISE MASS HEAD;  Resection Of Right Scalp Mass ;  Surgeon: Anson Pinedo MD;  Location:  OR       Social History     Tobacco Use     Smoking status: Former Smoker     Packs/day: 0.50     Years: 27.00     Pack years: 13.50     Types: Other     Last attempt to quit: 2013     Years since quittin.4     Smokeless tobacco: Never Used     Tobacco comment: E-cig user everyday -- Off cigarettes   Substance Use Topics     Alcohol use: Yes     Alcohol/week: 1.0 oz     Types: 2 Standard drinks or equivalent per week     Comment: 2-3 drinks a month      Family History   Problem Relation Age of Onset     Cerebrovascular Disease Maternal Grandfather      Cardiovascular Paternal Grandfather      Heart Disease Paternal Grandfather      Diabetes Paternal Grandfather      Myocardial Infarction Father      Asthma No family hx of      C.A.D. No family hx of      Hypertension No family hx of      Breast Cancer No family hx of      Cancer - colorectal No family hx of      Prostate Cancer No family hx of      Cancer No family hx of      Blood Disease No family hx of      Arthritis No family hx of      Alzheimer Disease No family hx of      Circulatory No family hx of      Eye Disorder No family hx of      Gastrointestinal Disease No family hx of      Genitourinary Problems No family hx of   "    Lipids No family hx of      Musculoskeletal Disorder No family hx of      Respiratory No family hx of      Thyroid Disease No family hx of      Neurologic Disorder No family hx of      Coronary Artery Disease No family hx of      Hyperlipidemia No family hx of      Ovarian Cancer No family hx of      Depression/Anxiety No family hx of      Anesthesia Reaction No family hx of      Thyroid Disease No family hx of      Osteoporosis No family hx of      Chemical Addiction No family hx of      Known Genetic Syndrome No family hx of      Depression No family hx of      Anxiety Disorder No family hx of      Mental Illness No family hx of      Substance Abuse No family hx of      Other Cancer No family hx of      Genetic Disorder No family hx of            Reviewed and updated as needed this visit by Provider         Review of Systems   ROS COMP: CONSTITUTIONAL: NEGATIVE for fever, chills, change in weight  INTEGUMENTARY/SKIN: NEGATIVE for worrisome rashes, moles or lesions  EYES: NEGATIVE for vision changes or irritation  ENT/MOUTH: NEGATIVE for ear, mouth and throat problems  RESP: NEGATIVE for significant cough or SOB  CV: NEGATIVE for chest pain, palpitations or peripheral edema  MUSCULOSKELETAL:as above.   NEURO: NEGATIVE for weakness, dizziness or paresthesias      Objective    /82   Pulse 68   Temp 98  F (36.7  C) (Temporal)   Resp 18   Ht 1.816 m (5' 11.5\")   Wt 113.9 kg (251 lb 3.2 oz)   BMI 34.55 kg/m    Body mass index is 34.55 kg/m .  Physical Exam   GENERAL: healthy, alert and no distress  NECK: no adenopathy, no asymmetry, masses, or scars and thyroid normal to palpation  RESP: lungs clear to auscultation - no rales, rhonchi or wheezes  CV: regular rate and rhythm, normal S1 S2, no S3 or S4, no murmur, click or rub, no peripheral edema and peripheral pulses strong  RIGHT SHOULDER: patient with tenderness over the anterior shoulder to palpation. Normal range of motion. Normal strength.   BACK: " "tenderness mid to upper back. Mimics the discomfort felt. Normal range of motion.             Assessment & Plan     1. Chronic pain syndrome  Patient with chronic pain.   Reviewed prior work up and prior treatment.   Discussed the importance of avoiding other controlled substances.   He does note that he has started vaping CBD oil on occasion. \"the kind that is made to vape and is safe\"  I did let him know that CBD oil is not regulated and would not consider this safe to vape.   Discussed that there are concerns with all vaping materials and he should avoid this.   - Drug  Screen Comprehensive , Urine with Reported Meds (MedTox) (Pain Care Package)  - diclofenac (VOLTAREN) 75 MG EC tablet; Take 1 tablet (75 mg) by mouth 2 times daily  Dispense: 60 tablet; Refill: 11  - traMADol (ULTRAM) 50 MG tablet; Take 1 tablet (50 mg) by mouth every 6 hours as needed for severe pain  Dispense: 40 tablet; Refill: 0    2. Incomplete tear of right rotator cuff, unspecified whether traumatic  Long history. No recent change.  At this time will continue with current plan.   Recommend no use of CBD.     3. Chronic autoimmune thyroiditis  Patient doing well   No plan change at this time.     4. Overuse syndrome of mid back, subsequent encounter  Patient with long history of this.   Will continue current plan of occasional use of diclofenac, ultram and flexeril.   Recommend heat and/or ice.   Recommend massage. Consider PT.   All questions invited, asked and answered to the patient's apparent satisfaction.  Patient agrees to plan.    - cyclobenzaprine (FLEXERIL) 10 MG tablet; Take 1 tablet (10 mg) by mouth every 12 hours as needed for muscle spasms  Dispense: 30 tablet; Refill: 0     BMI:   Estimated body mass index is 34.55 kg/m  as calculated from the following:    Height as of this encounter: 1.816 m (5' 11.5\").    Weight as of this encounter: 113.9 kg (251 lb 3.2 oz).   Weight management plan: Discussed healthy diet and exercise " guidelines            Return in about 6 months (around 3/13/2020) for Recheck, Physical Exam.    JENNA MARTINES MD, MD  [facility]

## 2019-09-13 ENCOUNTER — OFFICE VISIT (OUTPATIENT)
Dept: FAMILY MEDICINE | Facility: CLINIC | Age: 49
End: 2019-09-13
Payer: COMMERCIAL

## 2019-09-13 VITALS
RESPIRATION RATE: 18 BRPM | SYSTOLIC BLOOD PRESSURE: 122 MMHG | TEMPERATURE: 98 F | BODY MASS INDEX: 34.02 KG/M2 | WEIGHT: 251.2 LBS | DIASTOLIC BLOOD PRESSURE: 82 MMHG | HEART RATE: 68 BPM | HEIGHT: 72 IN
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
## 2019-09-13 DIAGNOSIS — S29.012D OVERUSE SYNDROME OF MID BACK, SUBSEQUENT ENCOUNTER: ICD-10-CM

## 2019-09-13 DIAGNOSIS — G89.4 CHRONIC PAIN SYNDROME: Primary | ICD-10-CM

## 2019-09-13 DIAGNOSIS — E06.3 CHRONIC AUTOIMMUNE THYROIDITIS: ICD-10-CM

## 2019-09-13 DIAGNOSIS — M75.111 INCOMPLETE TEAR OF RIGHT ROTATOR CUFF, UNSPECIFIED WHETHER TRAUMATIC: ICD-10-CM

## 2019-09-13 PROCEDURE — 99214 OFFICE O/P EST MOD 30 MIN: CPT | Performed by: FAMILY MEDICINE

## 2019-09-13 PROCEDURE — 99000 SPECIMEN HANDLING OFFICE-LAB: CPT | Performed by: FAMILY MEDICINE

## 2019-09-13 PROCEDURE — 80307 DRUG TEST PRSMV CHEM ANLYZR: CPT | Mod: 90 | Performed by: FAMILY MEDICINE

## 2019-09-13 RX ORDER — CYCLOBENZAPRINE HCL 10 MG
10 TABLET ORAL EVERY 12 HOURS PRN
Qty: 30 TABLET | Refills: 0 | Status: SHIPPED | OUTPATIENT
Start: 2019-09-13 | End: 2021-01-15

## 2019-09-13 RX ORDER — TRAMADOL HYDROCHLORIDE 50 MG/1
50 TABLET ORAL EVERY 6 HOURS PRN
Qty: 40 TABLET | Refills: 0 | Status: SHIPPED | OUTPATIENT
Start: 2019-09-13 | End: 2019-12-03

## 2019-09-13 RX ORDER — DICLOFENAC SODIUM 75 MG/1
75 TABLET, DELAYED RELEASE ORAL 2 TIMES DAILY
Qty: 60 TABLET | Refills: 11 | Status: SHIPPED | OUTPATIENT
Start: 2019-09-13 | End: 2020-03-13

## 2019-09-13 ASSESSMENT — ANXIETY QUESTIONNAIRES
2. NOT BEING ABLE TO STOP OR CONTROL WORRYING: NOT AT ALL
7. FEELING AFRAID AS IF SOMETHING AWFUL MIGHT HAPPEN: NOT AT ALL
4. TROUBLE RELAXING: NOT AT ALL
3. WORRYING TOO MUCH ABOUT DIFFERENT THINGS: NOT AT ALL
5. BEING SO RESTLESS THAT IT IS HARD TO SIT STILL: NOT AT ALL
6. BECOMING EASILY ANNOYED OR IRRITABLE: NOT AT ALL
GAD7 TOTAL SCORE: 0
GAD7 TOTAL SCORE: 0
7. FEELING AFRAID AS IF SOMETHING AWFUL MIGHT HAPPEN: NOT AT ALL
1. FEELING NERVOUS, ANXIOUS, OR ON EDGE: NOT AT ALL
GAD7 TOTAL SCORE: 0

## 2019-09-13 ASSESSMENT — PATIENT HEALTH QUESTIONNAIRE - PHQ9
10. IF YOU CHECKED OFF ANY PROBLEMS, HOW DIFFICULT HAVE THESE PROBLEMS MADE IT FOR YOU TO DO YOUR WORK, TAKE CARE OF THINGS AT HOME, OR GET ALONG WITH OTHER PEOPLE: NOT DIFFICULT AT ALL
SUM OF ALL RESPONSES TO PHQ QUESTIONS 1-9: 0
SUM OF ALL RESPONSES TO PHQ QUESTIONS 1-9: 0

## 2019-09-13 ASSESSMENT — MIFFLIN-ST. JEOR: SCORE: 2034.5

## 2019-09-13 ASSESSMENT — PAIN SCALES - GENERAL: PAINLEVEL: MILD PAIN (3)

## 2019-09-13 NOTE — LETTER
2019      Sukhdeep Wofl  [address]  [facility] [address]        To Whom It May Concern:    Sukhdeep Wolf is under my professional care for Partial Right Rotator Cuff Tear.     He may return to work with the followin. He may continue to work in his current position     2. Recommend limiting his work week to 5 days per week, 8-10 hours per day to avoid injury.      3. Recommend having 2 consecutive days off to recover (Saturday and ), to avoid injury.     4. Recommending limiting the amount of resistance needed involving the right arm due to a partial rotator cuff tear--stable at present.        Sincerely,          Lexi Martinez MD

## 2019-09-14 ASSESSMENT — PATIENT HEALTH QUESTIONNAIRE - PHQ9: SUM OF ALL RESPONSES TO PHQ QUESTIONS 1-9: 0

## 2019-09-14 ASSESSMENT — ANXIETY QUESTIONNAIRES: GAD7 TOTAL SCORE: 0

## 2019-09-20 LAB — PAIN DRUG SCR UR W RPTD MEDS: NORMAL

## 2019-12-03 ENCOUNTER — TELEPHONE (OUTPATIENT)
Dept: FAMILY MEDICINE | Facility: CLINIC | Age: 49
End: 2019-12-03

## 2019-12-03 DIAGNOSIS — G89.4 CHRONIC PAIN SYNDROME: ICD-10-CM

## 2019-12-03 RX ORDER — TRAMADOL HYDROCHLORIDE 50 MG/1
50 TABLET ORAL EVERY 6 HOURS PRN
Qty: 14 TABLET | Refills: 0 | Status: SHIPPED | OUTPATIENT
Start: 2019-12-03 | End: 2020-03-13

## 2019-12-03 NOTE — TELEPHONE ENCOUNTER
Reason for call:  Medication  Reason for Call:  Medication or medication refill:    Do you use a [facility]?  Name of the pharmacy and phone number for the current request:  [facility] - [phone]    Name of the medication requested: Tramadol    Other request: Pharmacy was only able to fill 28 need new script for additional 14 sent for the rest of the script.     Can we leave a detailed message on this number? YES    Phone number patient can be reached at: Cell number on file:    Telephone Information:   Mobile [phone]       Best Time: Any    Call taken on 12/3/2019 at 1:53 PM by Trinh Koehler

## 2019-12-04 ENCOUNTER — TELEPHONE (OUTPATIENT)
Dept: FAMILY MEDICINE | Facility: CLINIC | Age: 49
End: 2019-12-04

## 2019-12-04 DIAGNOSIS — E03.9 HYPOTHYROIDISM, UNSPECIFIED TYPE: ICD-10-CM

## 2019-12-05 RX ORDER — LEVOTHYROXINE SODIUM 88 UG/1
TABLET ORAL
Qty: 30 TABLET | Refills: 0 | Status: SHIPPED | OUTPATIENT
Start: 2019-12-05 | End: 2019-12-05

## 2019-12-05 RX ORDER — LEVOTHYROXINE SODIUM 88 UG/1
88 TABLET ORAL DAILY
Qty: 90 TABLET | Refills: 0 | Status: SHIPPED | OUTPATIENT
Start: 2019-12-05 | End: 2020-03-25

## 2019-12-05 NOTE — TELEPHONE ENCOUNTER
Pending Prescriptions:                       Disp   Refills    levothyroxine (SYNTHROID/LEVOTHROID) 88 M*90 tab*0            Sig: TAKE 1 TABLET BY MOUTH ONCE DAILY    Medication is being filled for 1 time refill only due to:  Future labs ordered TSH, non fasting.   Please help schedule Lab only appt    LOV 9/13/2019, return in 6 months    Indiana Machuca RN, BSN

## 2019-12-05 NOTE — TELEPHONE ENCOUNTER
Pt informed.  He is wondering if he can get a 90 days supply instead, as he tries to keep hi visits to the clinic limited to every six months, due to costs.  He was last here 9/2019.

## 2020-03-02 ENCOUNTER — HEALTH MAINTENANCE LETTER (OUTPATIENT)
Age: 50
End: 2020-03-02

## 2020-03-13 ENCOUNTER — TELEPHONE (OUTPATIENT)
Dept: FAMILY MEDICINE | Facility: CLINIC | Age: 50
End: 2020-03-13

## 2020-03-13 ENCOUNTER — OFFICE VISIT (OUTPATIENT)
Dept: FAMILY MEDICINE | Facility: CLINIC | Age: 50
End: 2020-03-13
Payer: COMMERCIAL

## 2020-03-13 VITALS
OXYGEN SATURATION: 96 % | BODY MASS INDEX: 33.81 KG/M2 | HEART RATE: 124 BPM | RESPIRATION RATE: 20 BRPM | TEMPERATURE: 97.5 F | HEIGHT: 72 IN | SYSTOLIC BLOOD PRESSURE: 114 MMHG | WEIGHT: 249.6 LBS | DIASTOLIC BLOOD PRESSURE: 74 MMHG

## 2020-03-13 DIAGNOSIS — G89.4 CHRONIC PAIN SYNDROME: ICD-10-CM

## 2020-03-13 DIAGNOSIS — Z13.1 SCREENING FOR DIABETES MELLITUS: ICD-10-CM

## 2020-03-13 DIAGNOSIS — Z13.220 LIPID SCREENING: ICD-10-CM

## 2020-03-13 DIAGNOSIS — E03.9 HYPOTHYROIDISM, UNSPECIFIED TYPE: Primary | ICD-10-CM

## 2020-03-13 DIAGNOSIS — J10.1 INFLUENZA A: Primary | ICD-10-CM

## 2020-03-13 DIAGNOSIS — Z79.899 ENCOUNTER FOR LONG-TERM (CURRENT) USE OF MEDICATIONS: ICD-10-CM

## 2020-03-13 DIAGNOSIS — J10.1 INFLUENZA A: ICD-10-CM

## 2020-03-13 LAB
ALBUMIN SERPL-MCNC: 3.9 G/DL (ref 3.4–5)
ALP SERPL-CCNC: 58 U/L (ref 40–150)
ALT SERPL W P-5'-P-CCNC: 84 U/L (ref 0–70)
ANION GAP SERPL CALCULATED.3IONS-SCNC: 6 MMOL/L (ref 3–14)
AST SERPL W P-5'-P-CCNC: 35 U/L (ref 0–45)
BILIRUB SERPL-MCNC: 0.7 MG/DL (ref 0.2–1.3)
BUN SERPL-MCNC: 17 MG/DL (ref 7–30)
CALCIUM SERPL-MCNC: 8.9 MG/DL (ref 8.5–10.1)
CHLORIDE SERPL-SCNC: 105 MMOL/L (ref 94–109)
CHOLEST SERPL-MCNC: 134 MG/DL
CO2 SERPL-SCNC: 28 MMOL/L (ref 20–32)
CREAT SERPL-MCNC: 1.34 MG/DL (ref 0.66–1.25)
FLUAV+FLUBV AG SPEC QL: NEGATIVE
FLUAV+FLUBV AG SPEC QL: POSITIVE
GFR SERPL CREATININE-BSD FRML MDRD: 61 ML/MIN/{1.73_M2}
GLUCOSE SERPL-MCNC: 74 MG/DL (ref 70–99)
HDLC SERPL-MCNC: 36 MG/DL
LDLC SERPL CALC-MCNC: 76 MG/DL
NONHDLC SERPL-MCNC: 98 MG/DL
POTASSIUM SERPL-SCNC: 4.1 MMOL/L (ref 3.4–5.3)
PROT SERPL-MCNC: 8 G/DL (ref 6.8–8.8)
SODIUM SERPL-SCNC: 139 MMOL/L (ref 133–144)
SPECIMEN SOURCE: ABNORMAL
TRIGL SERPL-MCNC: 111 MG/DL
TSH SERPL DL<=0.005 MIU/L-ACNC: 3.68 MU/L (ref 0.4–4)

## 2020-03-13 PROCEDURE — 36415 COLL VENOUS BLD VENIPUNCTURE: CPT | Performed by: FAMILY MEDICINE

## 2020-03-13 PROCEDURE — 87804 INFLUENZA ASSAY W/OPTIC: CPT | Performed by: FAMILY MEDICINE

## 2020-03-13 PROCEDURE — 99214 OFFICE O/P EST MOD 30 MIN: CPT | Performed by: FAMILY MEDICINE

## 2020-03-13 PROCEDURE — 84443 ASSAY THYROID STIM HORMONE: CPT | Performed by: FAMILY MEDICINE

## 2020-03-13 PROCEDURE — 80061 LIPID PANEL: CPT | Performed by: FAMILY MEDICINE

## 2020-03-13 PROCEDURE — 80053 COMPREHEN METABOLIC PANEL: CPT | Performed by: FAMILY MEDICINE

## 2020-03-13 RX ORDER — OSELTAMIVIR PHOSPHATE 75 MG/1
75 CAPSULE ORAL 2 TIMES DAILY
Qty: 10 CAPSULE | Refills: 0 | Status: SHIPPED | OUTPATIENT
Start: 2020-03-13 | End: 2020-03-18

## 2020-03-13 RX ORDER — DICLOFENAC SODIUM 75 MG/1
75 TABLET, DELAYED RELEASE ORAL 2 TIMES DAILY
Qty: 60 TABLET | Refills: 11 | Status: SHIPPED | OUTPATIENT
Start: 2020-03-13 | End: 2021-01-15

## 2020-03-13 RX ORDER — TRAMADOL HYDROCHLORIDE 50 MG/1
50 TABLET ORAL EVERY 6 HOURS PRN
Qty: 20 TABLET | Refills: 1 | Status: SHIPPED | OUTPATIENT
Start: 2020-03-13 | End: 2021-01-15

## 2020-03-13 ASSESSMENT — MIFFLIN-ST. JEOR: SCORE: 2035.18

## 2020-03-13 ASSESSMENT — PAIN SCALES - GENERAL: PAINLEVEL: SEVERE PAIN (7)

## 2020-03-13 NOTE — PROGRESS NOTES
Subjective     Sukhdeep Wolf is a 49 year old male who presents to clinic today for the following health issues:    HPI   Hypothyroidism Follow-up      Since last visit, patient describes the following symptoms: dry skin and loose stools      How many servings of fruits and vegetables do you eat daily?  0-1    On average, how many sweetened beverages do you drink each day (Examples: soda, juice, sweet tea, etc.  Do NOT count diet or artificially sweetened beverages)?   1    How many days per week do you exercise enough to make your heart beat faster? 3 or less    How many minutes a day do you exercise enough to make your heart beat faster? 9 or less  How many days per week do you miss taking your medication? Maybe once a month    What makes it hard for you to take your medications?  remembering to take    Acute Illness - no international travel and no known exposures to COVID 19   Acute illness concerns: fever  Onset: 3/11/2020    Fever: YES- 101.5 last night    Chills/Sweats: YES- both    Headache (location?): YES    Sinus Pressure:no    Conjunctivitis:  YES- watery    Ear Pain: YES: left, but right feels alitlle off     Rhinorrhea: YES- slight    Congestion: YES- slight    Sore Throat: no      Cough: YES - slight    Wheeze: YES    Decreased Appetite: YES    Nausea: no     Vomiting: no     Diarrhea:  no     Dysuria/Freq.: no     Fatigue/Achiness: YES- both    Sick/Strep Exposure: no      Therapies Tried and outcome: Nyquil    Once per month has an issue with his ears. Feels vibration through the right ear. Last for hours when it happens.   No hearing changes.     Joint Pain    Onset: a few year    Description:   Location: right shoulder, and upper back  Character: it varies     Intensity: 0/10    Progression of Symptoms: same    Accompanying Signs & Symptoms:  Other symptoms: none    History:   Previous similar pain: no       Precipitating factors:   Trauma or overuse: YES    Alleviating factors:  Improved by: not  pushing his limits    Therapies Tried and outcome: Tramadol, Voltaren, Advil    He is doing well with his current regimen.   He can only receive #20 at a time per insurance.         Patient Active Problem List   Diagnosis     CARDIOVASCULAR SCREENING; LDL GOAL LESS THAN 160     Intracranial arachnoid cyst     Lipoma of skin and subcutaneous tissue     Scalp mass     Scalp lesion     Lateral epicondylitis     Overuse syndrome of mid back, subsequent encounter     AC (acromioclavicular) joint arthritis     Chronic pain syndrome     Incomplete tear of right rotator cuff     Chronic autoimmune thyroiditis     Past Surgical History:   Procedure Laterality Date     EXCISE MASS HEAD  2013    Procedure: EXCISE MASS HEAD;  Resection Of Right Scalp Mass ;  Surgeon: Anson Pinedo MD;  Location:  OR       Social History     Tobacco Use     Smoking status: Former Smoker     Packs/day: 0.50     Years: 27.00     Pack years: 13.50     Types: Other     Last attempt to quit: 2013     Years since quittin.9     Smokeless tobacco: Never Used     Tobacco comment: E-cig user everyday -- Off cigarettes   Substance Use Topics     Alcohol use: Yes     Alcohol/week: 1.7 standard drinks     Types: 2 Standard drinks or equivalent per week     Comment: 2-3 drinks a month      Family History   Problem Relation Age of Onset     Cerebrovascular Disease Maternal Grandfather      Cardiovascular Paternal Grandfather      Heart Disease Paternal Grandfather      Diabetes Paternal Grandfather      Myocardial Infarction Father      Asthma No family hx of      C.A.D. No family hx of      Hypertension No family hx of      Breast Cancer No family hx of      Cancer - colorectal No family hx of      Prostate Cancer No family hx of      Cancer No family hx of      Blood Disease No family hx of      Arthritis No family hx of      Alzheimer Disease No family hx of      Circulatory No family hx of      Eye Disorder No family hx of       Gastrointestinal Disease No family hx of      Genitourinary Problems No family hx of      Lipids No family hx of      Musculoskeletal Disorder No family hx of      Respiratory No family hx of      Thyroid Disease No family hx of      Neurologic Disorder No family hx of      Coronary Artery Disease No family hx of      Hyperlipidemia No family hx of      Ovarian Cancer No family hx of      Depression/Anxiety No family hx of      Anesthesia Reaction No family hx of      Thyroid Disease No family hx of      Osteoporosis No family hx of      Chemical Addiction No family hx of      Known Genetic Syndrome No family hx of      Depression No family hx of      Anxiety Disorder No family hx of      Mental Illness No family hx of      Substance Abuse No family hx of      Other Cancer No family hx of      Genetic Disorder No family hx of          BP Readings from Last 3 Encounters:   03/13/20 114/74   09/13/19 122/82   01/15/19 116/84    Wt Readings from Last 3 Encounters:   03/13/20 113.2 kg (249 lb 9.6 oz)   09/13/19 113.9 kg (251 lb 3.2 oz)   01/15/19 114.7 kg (252 lb 12.8 oz)                    Reviewed and updated as needed this visit by Provider  Tobacco  Allergies  Meds  Problems  Med Hx  Surg Hx  Fam Hx         Review of Systems   ROS COMP: CONSTITUTIONAL:as above.   INTEGUMENTARY/SKIN: NEGATIVE for worrisome rashes, moles or lesions  EYES: NEGATIVE for vision changes or irritation  ENT/MOUTH: as above.   RESP:as above.   CV: NEGATIVE for chest pain, palpitations or peripheral edema  GI: NEGATIVE for nausea, abdominal pain, heartburn, or change in bowel habits  MUSCULOSKELETAL:myalgias as above.   ENDOCRINE: NEGATIVE for temperature intolerance, skin/hair changes  HEME: NEGATIVE for bleeding problems  PSYCHIATRIC: NEGATIVE for changes in mood or affect      Objective    /74   Pulse 124   Temp 97.5  F (36.4  C) (Temporal)   Resp 20   Ht 1.829 m (6')   Wt 113.2 kg (249 lb 9.6 oz)   SpO2 96%   BMI  33.85 kg/m    Body mass index is 33.85 kg/m .  Physical Exam   GENERAL: alert, fatigued and appears ill. Nontoxic appearing.   EYES: Eyes grossly normal to inspection  HENT: normal cephalic/atraumatic, both ears: clear effusion, nose and mouth without ulcers or lesions, oropharynx clear and oral mucous membranes moist  NECK: no adenopathy, no asymmetry, masses, or scars and thyroid normal to palpation  RESP: lungs clear to auscultation - no rales, rhonchi or wheezes  CV: regular rate and rhythm, normal S1 S2, no S3 or S4, no murmur, click or rub, no peripheral edema and peripheral pulses strong  MS: no gross musculoskeletal defects noted, no edema  SKIN: no suspicious lesions or rashes    Diagnostic Test Results:  Labs reviewed in Epic  Results for orders placed or performed in visit on 03/13/20      Influenza A/B antigen     Status: Abnormal   Result Value Ref Range    Influenza A/B Agn Specimen Nasal     Influenza A Positive (A) NEG^Negative    Influenza B Negative NEG^Negative           Assessment & Plan     1. Hypothyroidism, unspecified type  Awaiting results. Dose adjustment as needed.    - TSH with free T4 reflex    2. Influenza A  Patient with positive influenza A.  Patient treated with Tamiflu after discussion of options.   Will recheck as needed for worsening cough, persistent fever or other concerns.   All questions invited, asked and answered to the patient's apparent satisfaction.  Patient agrees to plan.    - Influenza A/B antigen    3. Chronic pain syndrome  Patient with chronic pain. He is overall controlled at this time.   No change in plan currently.   Refills sent.   - diclofenac (VOLTAREN) 75 MG EC tablet; Take 1 tablet (75 mg) by mouth 2 times daily  Dispense: 60 tablet; Refill: 11  - traMADol (ULTRAM) 50 MG tablet; Take 1 tablet (50 mg) by mouth every 6 hours as needed for severe pain  Dispense: 20 tablet; Refill: 1    4. Encounter for long-term (current) use of medications  Will notify of  results.   - Lipid panel reflex to direct LDL Fasting    5. Lipid screening  Will notify of results.   - Lipid panel reflex to direct LDL Fasting    6. Screening for diabetes mellitus  Will notify of results.   - Comprehensive metabolic panel     BMI:   Estimated body mass index is 33.85 kg/m  as calculated from the following:    Height as of this encounter: 1.829 m (6').    Weight as of this encounter: 113.2 kg (249 lb 9.6 oz).   Weight management plan: Discussed healthy diet and exercise guidelines            Return in about 6 months (around 9/13/2020) for Recheck.    Lexi Martinez MD  [facility]

## 2020-03-13 NOTE — TELEPHONE ENCOUNTER
Reason for Call:  Request for results:    Name of test or procedure: flu test    Date of test of procedure: today    Location of the test or procedure: [facility] [place] to leave the result message on voice mail or with a family member? YES    Phone number Patient can be reached at:  Home number on file [phone] (home)    Additional comments: please call with results.    Call taken on 3/13/2020 at 3:41 PM by Sandra Bernstein

## 2020-03-13 NOTE — TELEPHONE ENCOUNTER
Provider, please advise on results for pt.  Pt also wanting to know the name of the OTC medication he should be getting for his ear pain.  He recalls it being a decongestant but wants to know exactly which one to purchase.  Please advise.      Influenza A  Positive  Abnormal    NEG^Negative   Final  03/13/2020  2:00 PM  RG    Influenza B  Negative   NEG^Negative   Final  03/13/2020  2:00 PM

## 2020-03-13 NOTE — TELEPHONE ENCOUNTER
Spoke with Sukhdeep. Will place on Tamiflu. All questions invited, asked and answered to the patient's apparent satisfaction.  Patient agrees to plan.

## 2020-03-13 NOTE — PATIENT INSTRUCTIONS
Flonase 2 sprays each nostril once per day.     Will take up to 2 weeks for improvement with your ear.  If worsening or fails to resolve.

## 2020-10-09 ENCOUNTER — VIRTUAL VISIT (OUTPATIENT)
Dept: FAMILY MEDICINE | Facility: OTHER | Age: 50
End: 2020-10-09
Payer: COMMERCIAL

## 2020-10-09 DIAGNOSIS — Z20.822 EXPOSURE TO COVID-19 VIRUS: ICD-10-CM

## 2020-10-09 DIAGNOSIS — Z20.822 EXPOSURE TO COVID-19 VIRUS: Primary | ICD-10-CM

## 2020-10-09 PROCEDURE — U0003 INFECTIOUS AGENT DETECTION BY NUCLEIC ACID (DNA OR RNA); SEVERE ACUTE RESPIRATORY SYNDROME CORONAVIRUS 2 (SARS-COV-2) (CORONAVIRUS DISEASE [COVID-19]), AMPLIFIED PROBE TECHNIQUE, MAKING USE OF HIGH THROUGHPUT TECHNOLOGIES AS DESCRIBED BY CMS-2020-01-R: HCPCS | Performed by: PHYSICIAN ASSISTANT

## 2020-10-09 PROCEDURE — 99213 OFFICE O/P EST LOW 20 MIN: CPT | Mod: TEL | Performed by: PHYSICIAN ASSISTANT

## 2020-10-09 NOTE — PROGRESS NOTES
"Sukhdeep Wolf is a 50 year old male who is being evaluated via a billable telephone visit.      The patient has been notified of following:     \"This telephone visit will be conducted via a call between you and your physician/provider. We have found that certain health care needs can be provided without the need for a physical exam.  This service lets us provide the care you need with a short phone conversation.  If a prescription is necessary we can send it directly to your pharmacy.  If lab work is needed we can place an order for that and you can then stop by our lab to have the test done at a later time.    Telephone visits are billed at different rates depending on your insurance coverage. During this emergency period, for some insurers they may be billed the same as an in-person visit.  Please reach out to your insurance provider with any questions.    If during the course of the call the physician/provider feels a telephone visit is not appropriate, you will not be charged for this service.\"    Patient has given verbal consent for Telephone visit?  Yes    What phone number would you like to be contacted at? [phone]  Pt phone will not ring if the phone number is not in his contact list. Please use urturn and use [phone] or [phone] as your caller ID number so the call goes through     How would you like to obtain your AVS? Hannah Veras     Sukhdeep Wolf is a 50 year old male who presents via phone visit today for the following health issues:    HPI     Acute Illness  Acute illness concerns: yesterday  Onset/Duration: slight symptoms, was closely exposed to mom while camping and she tested positive   Symptoms:  Fever: no  Chills/Sweats: no  Headache (location?): YES  Sinus Pressure: no  Conjunctivitis:  no  Ear Pain: no  Rhinorrhea: no  Congestion: no  Sore Throat: YES- slight   Cough: no  Wheeze: no  Decreased Appetite: no  Nausea: YES- last night  Vomiting: no  Diarrhea: " no  Dysuria/Freq.: no  Dysuria or Hematuria: no  Fatigue/Achiness: tired   Sick/Strep Exposure: YES- mom  Therapies tried and outcome: None     Mom tested positive for COVID 19 yesterday. Was with her camping last weekend. Symptoms have been occurring on occasion so didn't initially think anything of it at first. Thought more allergy related. In the last couple days has had a headache, slight sore throat, nausea that occurred last night and feeling more tired. Mother's only symptoms was a sore throat. Patient would like testing ordered at this time.    Review of Systems   Constitutional, HEENT, cardiovascular, pulmonary, gi and gu systems are negative, except as otherwise noted.       Objective          Vitals:  No vitals were obtained today due to virtual visit.    healthy, alert and no distress  PSYCH: Alert and oriented times 3; coherent speech, normal   rate and volume, able to articulate logical thoughts, able   to abstract reason, no tangential thoughts, no hallucinations   or delusions  His affect is normal and pleasant  RESP: No cough, no audible wheezing, able to talk in full sentences  Remainder of exam unable to be completed due to telephone visits    No results found for this or any previous visit (from the past 24 hour(s)).        Assessment/Plan:    Assessment & Plan     Exposure to COVID-19 virus  Patient exposed last weekend to mother who just recently tested positive for COVID-19. Orders placed for testing to be completed. Patient will continue to quarantine and supportive cares. Patient will follow-up in clinic if new symptoms develop or current symptoms fail to improve.  - Symptomatic COVID-19 Virus (Coronavirus) by PCR; Future     The patient indicates understanding of these issues and agrees with the plan.    Love Romero PA-C  [facility]    Phone call duration:  8 minutes

## 2020-10-10 LAB
SARS-COV-2 RNA SPEC QL NAA+PROBE: NOT DETECTED
SPECIMEN SOURCE: NORMAL

## 2020-12-11 ENCOUNTER — TELEPHONE (OUTPATIENT)
Dept: FAMILY MEDICINE | Facility: CLINIC | Age: 50
End: 2020-12-11

## 2020-12-11 NOTE — TELEPHONE ENCOUNTER
Panel Management Review      Patient has the following on his problem list: None      Composite cancer screening  Chart review shows that this patient is due/due soon for the following Colonoscopy  Summary:    Patient is due/failing the following:   COLONOSCOPY and PHYSICAL    Action needed:   Hepatitis C screening  Influenza vaccine    Type of outreach:    Phone, spoke to patient.  Scheculed appointment    Questions for provider review:    None                                                                                                                                    Emily Mckeon, CMA

## 2020-12-20 ENCOUNTER — HEALTH MAINTENANCE LETTER (OUTPATIENT)
Age: 50
End: 2020-12-20

## 2021-01-13 NOTE — PROGRESS NOTES
"SUBJECTIVE:   CC: Sukhdeep Wolf is an 50 year old male who presents for preventative health visit.     Patient has been advised of split billing requirements and indicates understanding: Yes     HPI    Hypothyroidism Follow-up    Since last visit, patient describes the following symptoms: Weight stable, no hair loss, no skin changes, no constipation, no loose stools    Chronic Pain Follow-Up  Where in your body do you have pain? Right rotator cuff  How has your pain affected your ability to work? 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   Which of these pain treatments have you tried since your last clinic visit? Other: exercising  How well are you sleeping? Up and down.   How has your mood been since your last visit? Better  Have you had a significant life event? No  Other aggravating factors: none  Taking medication as directed? Yes  Has been exercising with \"bungees\". This seems to help.  He utilizes tramadol periodically.  He was given #20 last March.  He reports he has a couple left.  He uses Flexeril on occasion as well as diclofenac.  He is requesting refills for these.    PHQ-9 SCORE 9/28/2018 10/25/2018 9/13/2019   PHQ-9 Total Score MyChart Incomplete - 0   PHQ-9 Total Score - 0 0     YOANA-7 SCORE 1/11/2017 10/25/2018 9/13/2019   Total Score - - 0 (minimal anxiety)   Total Score 0 0 0     REFLUX  Taking cimetidine. Comes and goes as an issue. Coffee, alcohol, breads/pastas. No issues. Medicine works well.       No flowsheet data found.  Encounter-Level CSA - 01/11/2017:    Controlled Substance Agreement - Scan on 1/24/2017 12:00 PM: CONTROLLED SUBSTANCE AGREEMENT     Patient-Level CSA:    There are no patient-level csa.       Today's PHQ-2 Score:   PHQ-2 ( 1999 [employer]) 1/15/2021   Q1: Little interest or pleasure in doing things 0   Q2: Feeling down, depressed or hopeless 0   PHQ-2 Score 0   Q1: Little interest or pleasure in doing things Not at all   Q2: Feeling down, depressed or hopeless Not at " all   PHQ-2 Score 0     Abuse: Current or Past(Physical, Sexual or Emotional)- No  Do you feel safe in your environment? Yes    Social History     Tobacco Use     Smoking status: Former Smoker     Packs/day: 0.50     Years: 27.00     Pack years: 13.50     Types: Other     Quit date: 2013     Years since quittin.7     Smokeless tobacco: Never Used     Tobacco comment: E-cig user everyday -- Off cigarettes   Substance Use Topics     Alcohol use: Yes     Alcohol/week: 1.7 standard drinks     Types: 2 Standard drinks or equivalent per week     Comment: 2-3 drinks a month      Alcohol Use 1/15/2021   Prescreen: >3 drinks/day or >7 drinks/week? No   Prescreen: >3 drinks/day or >7 drinks/week? -     Last PSA: No results found for: PSA    Reviewed orders with patient. Reviewed health maintenance and updated orders accordingly - Yes  BP Readings from Last 3 Encounters:   01/15/21 124/88   20 114/74   19 122/82    Wt Readings from Last 3 Encounters:   01/15/21 122.5 kg (270 lb 1.6 oz)   20 113.2 kg (249 lb 9.6 oz)   19 113.9 kg (251 lb 3.2 oz)                    Reviewed and updated as needed this visit by clinical staff  Tobacco  Allergies  Meds  Problems  Med Hx  Surg Hx  Fam Hx  Soc Hx          Reviewed and updated as needed this visit by Provider  Tobacco  Allergies  Meds  Problems  Med Hx  Surg Hx  Fam Hx             Review of Systems  CONSTITUTIONAL: NEGATIVE for fever, chills, change in weight  INTEGUMENTARY/SKIN: NEGATIVE for worrisome rashes, moles or lesions  EYES: NEGATIVE for vision changes or irritation  ENT: NEGATIVE for ear, mouth and throat problems  RESP: NEGATIVE for significant cough or SOB  CV: NEGATIVE for chest pain, palpitations or peripheral edema  GI: NEGATIVE for nausea, abdominal pain, heartburn, or change in bowel habits   male: negative for dysuria, hematuria, decreased urinary stream, erectile dysfunction, urethral discharge  MUSCULOSKELETAL: As  "above  NEURO: NEGATIVE for weakness, dizziness or paresthesias  PSYCHIATRIC: NEGATIVE for changes in mood or affect    OBJECTIVE:   /88   Pulse 78   Temp 98.1  F (36.7  C) (Temporal)   Resp 16   Ht 1.82 m (5' 11.65\")   Wt 122.5 kg (270 lb 1.6 oz)   SpO2 97%   BMI 36.99 kg/m      Physical Exam  GENERAL: healthy, alert and no distress  EYES: Eyes grossly normal to inspection, PERRL and conjunctivae and sclerae normal  HENT: ear canals and TM's normal, nose and mouth without ulcers or lesions  NECK: no adenopathy, no asymmetry, masses, or scars and thyroid normal to palpation  RESP: lungs clear to auscultation - no rales, rhonchi or wheezes  CV: regular rate and rhythm, normal S1 S2, no S3 or S4, no murmur, click or rub, no peripheral edema and peripheral pulses strong  ABDOMEN: soft, nontender, no hepatosplenomegaly, no masses and bowel sounds normal  MS: no gross musculoskeletal defects noted, no edema  SKIN: no suspicious lesions or rashes  NEURO: Normal strength and tone, mentation intact and speech normal  PSYCH: mentation appears normal, affect normal/bright    Diagnostic Test Results:  Labs reviewed in Epic    ASSESSMENT/PLAN:   1. Routine general medical examination at a health care facility  See counseling messages    2. Hypothyroidism, unspecified type  Patient reports he is doing well.  He is due for labs today.  He will have the TSH checked and will notify results.  Adjust therapy as needed.  Patient agrees to plan.  - levothyroxine (SYNTHROID/LEVOTHROID) 88 MCG tablet; Take 1 tablet (88 mcg) by mouth daily  Dispense: 90 tablet; Refill: 3  - TSH with free T4 reflex    3. Chronic pain syndrome  Shoulder pain.  Stable.  Has occasional use of tramadol and diclofenac.  He is requesting refills today.  Refills given.  Recheck in 1 year or sooner if he is requiring refills  - diclofenac (VOLTAREN) 75 MG EC tablet; Take 1 tablet (75 mg) by mouth 2 times daily  Dispense: 60 tablet; Refill: 3  - " "traMADol (ULTRAM) 50 MG tablet; Take 1 tablet (50 mg) by mouth every 6 hours as needed for severe pain  Dispense: 20 tablet; Refill: 1    4. Overuse syndrome of mid back, subsequent encounter  Doing well.  Stable.  Only occasional use of Flexeril.  Refill given.  - cyclobenzaprine (FLEXERIL) 10 MG tablet; Take 1 tablet (10 mg) by mouth every 12 hours as needed for muscle spasms  Dispense: 30 tablet; Refill: 0    5. Lipid screening  We will notify results  - Lipid panel reflex to direct LDL Fasting    6. Screen for colon cancer  Discussed colon cancer screening and options to screen.  Patient agrees to proceed with colonoscopy.  Discussed the procedure.  Referral placed.  - GASTROENTEROLOGY ADULT REF PROCEDURE ONLY; Future    7. Need for hepatitis C screening test  Discussed with patient agrees to proceed.  Notify results  - Hepatitis C Screen Reflex to HCV RNA Quant and Genotype    8. Screening for diabetes mellitus  We will notify results  - Comprehensive metabolic panel (BMP + Alb, Alk Phos, ALT, AST, Total. Bili, TP)    Patient has been advised of split billing requirements and indicates understanding: Yes  COUNSELING:   Reviewed preventive health counseling, as reflected in patient instructions       Regular exercise       Healthy diet/nutrition    Estimated body mass index is 36.99 kg/m  as calculated from the following:    Height as of this encounter: 1.82 m (5' 11.65\").    Weight as of this encounter: 122.5 kg (270 lb 1.6 oz).     Weight management plan: Discussed healthy diet and exercise guidelines    He reports that he quit smoking about 7 years ago. His smoking use included other. He has a 13.50 pack-year smoking history. He has never used smokeless tobacco.      Counseling Resources:  ATP IV Guidelines  Pooled Cohorts Equation Calculator  FRAX Risk Assessment  ICSI Preventive Guidelines  Dietary Guidelines for Americans, 2010  USDA's MyPlate  ASA Prophylaxis  Lung CA Screening    Lexi Martinez MD  M " [facility]

## 2021-01-15 ENCOUNTER — OFFICE VISIT (OUTPATIENT)
Dept: FAMILY MEDICINE | Facility: CLINIC | Age: 51
End: 2021-01-15
Payer: COMMERCIAL

## 2021-01-15 VITALS
HEIGHT: 72 IN | RESPIRATION RATE: 16 BRPM | TEMPERATURE: 98.1 F | BODY MASS INDEX: 36.59 KG/M2 | OXYGEN SATURATION: 97 % | DIASTOLIC BLOOD PRESSURE: 88 MMHG | SYSTOLIC BLOOD PRESSURE: 124 MMHG | HEART RATE: 78 BPM | WEIGHT: 270.1 LBS

## 2021-01-15 DIAGNOSIS — S29.012D OVERUSE SYNDROME OF MID BACK, SUBSEQUENT ENCOUNTER: ICD-10-CM

## 2021-01-15 DIAGNOSIS — Z13.220 LIPID SCREENING: ICD-10-CM

## 2021-01-15 DIAGNOSIS — G89.4 CHRONIC PAIN SYNDROME: ICD-10-CM

## 2021-01-15 DIAGNOSIS — Z12.11 SCREEN FOR COLON CANCER: ICD-10-CM

## 2021-01-15 DIAGNOSIS — Z11.59 NEED FOR HEPATITIS C SCREENING TEST: ICD-10-CM

## 2021-01-15 DIAGNOSIS — E03.9 HYPOTHYROIDISM, UNSPECIFIED TYPE: ICD-10-CM
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
## 2021-01-15 DIAGNOSIS — Z13.1 SCREENING FOR DIABETES MELLITUS: ICD-10-CM

## 2021-01-15 DIAGNOSIS — Z00.00 ROUTINE GENERAL MEDICAL EXAMINATION AT A HEALTH CARE FACILITY: Primary | ICD-10-CM

## 2021-01-15 PROCEDURE — 99396 PREV VISIT EST AGE 40-64: CPT | Performed by: FAMILY MEDICINE

## 2021-01-15 PROCEDURE — 99213 OFFICE O/P EST LOW 20 MIN: CPT | Mod: 25 | Performed by: FAMILY MEDICINE

## 2021-01-15 RX ORDER — DICLOFENAC SODIUM 75 MG/1
75 TABLET, DELAYED RELEASE ORAL 2 TIMES DAILY
Qty: 60 TABLET | Refills: 3 | Status: SHIPPED | OUTPATIENT
Start: 2021-01-15 | End: 2022-03-18

## 2021-01-15 RX ORDER — LEVOTHYROXINE SODIUM 88 UG/1
88 TABLET ORAL DAILY
Qty: 90 TABLET | Refills: 3 | Status: SHIPPED | OUTPATIENT
Start: 2021-01-15 | End: 2021-02-04 | Stop reason: DRUGHIGH

## 2021-01-15 RX ORDER — TRAMADOL HYDROCHLORIDE 50 MG/1
50 TABLET ORAL EVERY 6 HOURS PRN
Qty: 20 TABLET | Refills: 1 | Status: SHIPPED | OUTPATIENT
Start: 2021-01-15 | End: 2021-10-28

## 2021-01-15 RX ORDER — CYCLOBENZAPRINE HCL 10 MG
10 TABLET ORAL EVERY 12 HOURS PRN
Qty: 30 TABLET | Refills: 0 | Status: SHIPPED | OUTPATIENT
Start: 2021-01-15 | End: 2023-01-24

## 2021-01-15 ASSESSMENT — ENCOUNTER SYMPTOMS
HEMATOCHEZIA: 0
COUGH: 0
PARESTHESIAS: 0
EYE PAIN: 0
FEVER: 0
CONSTIPATION: 0
PALPITATIONS: 0
JOINT SWELLING: 0
MYALGIAS: 0
DYSURIA: 0
NAUSEA: 0
DIZZINESS: 0
NERVOUS/ANXIOUS: 0
HEADACHES: 0
SORE THROAT: 0
DIARRHEA: 0
ABDOMINAL PAIN: 0
ARTHRALGIAS: 0
SHORTNESS OF BREATH: 0
CHILLS: 0
HEARTBURN: 0
WEAKNESS: 0
HEMATURIA: 0
FREQUENCY: 0

## 2021-01-15 ASSESSMENT — MIFFLIN-ST. JEOR: SCORE: 2117.68

## 2021-01-24 DIAGNOSIS — Z11.59 ENCOUNTER FOR SCREENING FOR OTHER VIRAL DISEASES: Primary | ICD-10-CM

## 2021-01-27 RX ORDER — BISACODYL 5 MG/1
15 TABLET, DELAYED RELEASE ORAL SEE ADMIN INSTRUCTIONS
Qty: 3 TABLET | Refills: 0 | Status: SHIPPED | OUTPATIENT
Start: 2021-01-27 | End: 2022-03-18

## 2021-02-01 DIAGNOSIS — Z13.1 SCREENING FOR DIABETES MELLITUS: ICD-10-CM

## 2021-02-01 DIAGNOSIS — Z11.59 ENCOUNTER FOR SCREENING FOR OTHER VIRAL DISEASES: ICD-10-CM

## 2021-02-01 DIAGNOSIS — E03.9 HYPOTHYROIDISM, UNSPECIFIED TYPE: ICD-10-CM

## 2021-02-01 DIAGNOSIS — Z11.59 NEED FOR HEPATITIS C SCREENING TEST: ICD-10-CM

## 2021-02-01 DIAGNOSIS — Z13.220 LIPID SCREENING: ICD-10-CM

## 2021-02-01 LAB
ALBUMIN SERPL-MCNC: 3.8 G/DL (ref 3.4–5)
ALP SERPL-CCNC: 57 U/L (ref 40–150)
ALT SERPL W P-5'-P-CCNC: 106 U/L (ref 0–70)
ANION GAP SERPL CALCULATED.3IONS-SCNC: 5 MMOL/L (ref 3–14)
AST SERPL W P-5'-P-CCNC: 30 U/L (ref 0–45)
BILIRUB SERPL-MCNC: 0.5 MG/DL (ref 0.2–1.3)
BUN SERPL-MCNC: 15 MG/DL (ref 7–30)
CALCIUM SERPL-MCNC: 8.7 MG/DL (ref 8.5–10.1)
CHLORIDE SERPL-SCNC: 106 MMOL/L (ref 94–109)
CHOLEST SERPL-MCNC: 157 MG/DL
CO2 SERPL-SCNC: 28 MMOL/L (ref 20–32)
CREAT SERPL-MCNC: 1.14 MG/DL (ref 0.66–1.25)
GFR SERPL CREATININE-BSD FRML MDRD: 74 ML/MIN/{1.73_M2}
GLUCOSE SERPL-MCNC: 101 MG/DL (ref 70–99)
HDLC SERPL-MCNC: 35 MG/DL
LDLC SERPL CALC-MCNC: 79 MG/DL
NONHDLC SERPL-MCNC: 122 MG/DL
POTASSIUM SERPL-SCNC: 4.1 MMOL/L (ref 3.4–5.3)
PROT SERPL-MCNC: 7.3 G/DL (ref 6.8–8.8)
SARS-COV-2 RNA RESP QL NAA+PROBE: NORMAL
SODIUM SERPL-SCNC: 139 MMOL/L (ref 133–144)
SPECIMEN SOURCE: NORMAL
T4 FREE SERPL-MCNC: 0.81 NG/DL (ref 0.76–1.46)
TRIGL SERPL-MCNC: 214 MG/DL
TSH SERPL DL<=0.005 MIU/L-ACNC: 10.21 MU/L (ref 0.4–4)

## 2021-02-01 PROCEDURE — U0005 INFEC AGEN DETEC AMPLI PROBE: HCPCS | Performed by: FAMILY MEDICINE

## 2021-02-01 PROCEDURE — 36415 COLL VENOUS BLD VENIPUNCTURE: CPT | Performed by: FAMILY MEDICINE

## 2021-02-01 PROCEDURE — 84439 ASSAY OF FREE THYROXINE: CPT | Performed by: FAMILY MEDICINE

## 2021-02-01 PROCEDURE — 80053 COMPREHEN METABOLIC PANEL: CPT | Performed by: FAMILY MEDICINE

## 2021-02-01 PROCEDURE — U0003 INFECTIOUS AGENT DETECTION BY NUCLEIC ACID (DNA OR RNA); SEVERE ACUTE RESPIRATORY SYNDROME CORONAVIRUS 2 (SARS-COV-2) (CORONAVIRUS DISEASE [COVID-19]), AMPLIFIED PROBE TECHNIQUE, MAKING USE OF HIGH THROUGHPUT TECHNOLOGIES AS DESCRIBED BY CMS-2020-01-R: HCPCS | Performed by: FAMILY MEDICINE

## 2021-02-01 PROCEDURE — 86803 HEPATITIS C AB TEST: CPT | Performed by: FAMILY MEDICINE

## 2021-02-01 PROCEDURE — 84443 ASSAY THYROID STIM HORMONE: CPT | Performed by: FAMILY MEDICINE

## 2021-02-01 PROCEDURE — 80061 LIPID PANEL: CPT | Performed by: FAMILY MEDICINE

## 2021-02-02 LAB
HCV AB SERPL QL IA: NONREACTIVE
LABORATORY COMMENT REPORT: NORMAL
SARS-COV-2 RNA RESP QL NAA+PROBE: NEGATIVE
SPECIMEN SOURCE: NORMAL

## 2021-02-04 ENCOUNTER — TELEPHONE (OUTPATIENT)
Dept: FAMILY MEDICINE | Facility: CLINIC | Age: 51
End: 2021-02-04

## 2021-02-04 DIAGNOSIS — E03.9 HYPOTHYROIDISM, UNSPECIFIED TYPE: ICD-10-CM

## 2021-02-04 RX ORDER — LEVOTHYROXINE SODIUM 100 UG/1
100 TABLET ORAL DAILY
Qty: 90 TABLET | Refills: 0 | Status: SHIPPED | OUTPATIENT
Start: 2021-02-04 | End: 2021-05-21

## 2021-02-05 ENCOUNTER — HOSPITAL ENCOUNTER (OUTPATIENT)
Facility: AMBULATORY SURGERY CENTER | Age: 51
Discharge: HOME | End: 2021-02-05
Attending: FAMILY MEDICINE | Admitting: FAMILY MEDICINE
Payer: COMMERCIAL

## 2021-02-05 VITALS
HEART RATE: 91 BPM | RESPIRATION RATE: 16 BRPM | DIASTOLIC BLOOD PRESSURE: 88 MMHG | TEMPERATURE: 98.2 F | OXYGEN SATURATION: 96 % | SYSTOLIC BLOOD PRESSURE: 124 MMHG

## 2021-02-05 DIAGNOSIS — Z12.11 SPECIAL SCREENING FOR MALIGNANT NEOPLASMS, COLON: Primary | ICD-10-CM

## 2021-02-05 LAB — COLONOSCOPY: NORMAL

## 2021-02-05 PROCEDURE — 99152 MOD SED SAME PHYS/QHP 5/>YRS: CPT | Mod: 59 | Performed by: FAMILY MEDICINE

## 2021-02-05 PROCEDURE — 99153 MOD SED SAME PHYS/QHP EA: CPT | Performed by: FAMILY MEDICINE

## 2021-02-05 PROCEDURE — G0121 COLON CA SCRN NOT HI RSK IND: HCPCS | Performed by: FAMILY MEDICINE

## 2021-02-05 PROCEDURE — G8918 PT W/O PREOP ORDER IV AB PRO: HCPCS

## 2021-02-05 PROCEDURE — 45378 DIAGNOSTIC COLONOSCOPY: CPT

## 2021-02-05 PROCEDURE — G8907 PT DOC NO EVENTS ON DISCHARG: HCPCS

## 2021-02-05 RX ORDER — ONDANSETRON 2 MG/ML
4 INJECTION INTRAMUSCULAR; INTRAVENOUS
Status: DISCONTINUED | OUTPATIENT
Start: 2021-02-05 | End: 2021-02-06 | Stop reason: HOSPADM

## 2021-02-05 RX ORDER — LIDOCAINE 40 MG/G
CREAM TOPICAL
Status: DISCONTINUED | OUTPATIENT
Start: 2021-02-05 | End: 2021-02-06 | Stop reason: HOSPADM

## 2021-02-05 RX ORDER — FENTANYL CITRATE 50 UG/ML
INJECTION, SOLUTION INTRAMUSCULAR; INTRAVENOUS PRN
Status: DISCONTINUED | OUTPATIENT
Start: 2021-02-05 | End: 2021-02-05 | Stop reason: HOSPADM

## 2021-02-26 ENCOUNTER — TELEPHONE (OUTPATIENT)
Dept: FAMILY MEDICINE | Facility: CLINIC | Age: 51
End: 2021-02-26

## 2021-02-26 NOTE — TELEPHONE ENCOUNTER
Reason for Call:  Other prescription    Detailed comments: Patient would like a call back to discuss dosage of thyroid medication.    Phone Number Patient can be reached at: Home number on file [phone] (home)    Best Time: any    Can we leave a detailed message on this number? YES    Call taken on 2/26/2021 at 9:01 AM by Shonna Gusman

## 2021-03-03 NOTE — TELEPHONE ENCOUNTER
Attempted to reach pt again but there was no answer/voicemail. Will close.    Catie Olivarez CMA (AAMA)

## 2021-03-04 ENCOUNTER — NURSE TRIAGE (OUTPATIENT)
Dept: FAMILY MEDICINE | Facility: CLINIC | Age: 51
End: 2021-03-04

## 2021-03-04 ENCOUNTER — OFFICE VISIT (OUTPATIENT)
Dept: FAMILY MEDICINE | Facility: CLINIC | Age: 51
End: 2021-03-04
Payer: COMMERCIAL

## 2021-03-04 ENCOUNTER — ANCILLARY PROCEDURE (OUTPATIENT)
Dept: GENERAL RADIOLOGY | Facility: CLINIC | Age: 51
End: 2021-03-04
Attending: FAMILY MEDICINE
Payer: COMMERCIAL

## 2021-03-04 VITALS
OXYGEN SATURATION: 96 % | SYSTOLIC BLOOD PRESSURE: 124 MMHG | RESPIRATION RATE: 16 BRPM | WEIGHT: 268 LBS | TEMPERATURE: 99.9 F | DIASTOLIC BLOOD PRESSURE: 72 MMHG | HEART RATE: 86 BPM | BODY MASS INDEX: 36.7 KG/M2

## 2021-03-04 DIAGNOSIS — R07.9 LEFT-SIDED CHEST PAIN: Primary | ICD-10-CM

## 2021-03-04 DIAGNOSIS — R07.9 LEFT-SIDED CHEST PAIN: ICD-10-CM

## 2021-03-04 PROCEDURE — 93000 ELECTROCARDIOGRAM COMPLETE: CPT | Performed by: FAMILY MEDICINE

## 2021-03-04 PROCEDURE — 99214 OFFICE O/P EST MOD 30 MIN: CPT | Performed by: FAMILY MEDICINE

## 2021-03-04 PROCEDURE — 71046 X-RAY EXAM CHEST 2 VIEWS: CPT | Performed by: RADIOLOGY

## 2021-03-04 ASSESSMENT — PAIN SCALES - GENERAL: PAINLEVEL: NO PAIN (1)

## 2021-03-04 NOTE — PROGRESS NOTES
Assessment & Plan   1.  Left-sided chest pain: Pain is not worse with exertion, actually mildly improved.  Is left-sided possibly radiating to left shoulder.  More likely related to his reflux.  EKG unchanged from 2018.  Had a normal stress test in 2016.  Chest x-ray unremarkable.  To rule out a cardiac cause given this is a new change I have recommended an echo stress test.  Expect this to be normal.  Recommended a reflux pillow or alternative way to elevate the head of his bed and consideration of other reflux medications such as Nexium or Prilosec.  Patient agreeable plan.  Reassured.  Follow-up as needed.  Call with stress test results when available.  - XR Chest 2 Views; Future  - Echocardiogram Exercise Stress; Future   - EKG 12-lead complete w/read - Clinics    Return in about 1 week (around 3/11/2021), or if symptoms worsen or fail to improve.    Abhijit Dubose MD  [facility]     This chart is completed utilizing dictation software; typos and/or incorrect word substitutions may unintentionally occur.      Subjective     Sukhdeep Wolf is a 50 year old male who presents to clinic today for the following health issues    HPI     Patient states that he has been having intermittent episodes lasting 5-10 minutes of mild left-sided chest pain.  Pinching in nature.  The area he describes it as around his heart. He gets these episodes multiple times per day. He will have an episode it will last 5-10 min then go away for 4 hours. He rates the pain 1-2/10 when it occurs. Patient feels it could be related to his acid reflux, stress, and he initially thought it was his thyroid. He states he has a fitbit and describes heartbeat tachycardic for him reading at 80. He normal/baseline HR is in the mid -high 60s. During conversation patient denies dizziness, n/v, seating, fever, breathing, or cough. He has no past history of heart or lung health concerns. He had prevoious EKG/Stress  test completed in 2016. Documentation is in the chart under the cardiology tab.     He has recently changed his thyroid dose to 100 mcg from 88.    Has no other concerns today.      Review of Systems   Constitutional, HEENT, lymph, Derm, MSK, cardiovascular, pulmonary, gi and gu systems are negative, except as otherwise noted.      Objective    /72   Pulse 86   Temp 99.9  F (37.7  C) (Temporal)   Resp 16   Wt 121.6 kg (268 lb)   SpO2 96%   BMI 36.70 kg/m    Body mass index is 36.7 kg/m .  Physical Exam   General: ppears well and in no acute distress.  Cardiovascular:  Regular rate and rhythm, normal S1 and S2 without murmur. No extra heartsounds or friction rub. Radial pulses present and equal bilaterally.  Respiratory: Lungs clear to auscultation bilaterally. No wheezing or crackles. No prolonged expiration. Symmetrical chest rise.  Musculoskeletal: No gross extremity deformities. No peripheral edema. Normal muscle bulk.     EKG: Normal sinus rhythm.  Left axis deviation consistent with left fascicular block.  Evidence of incomplete right bundle branch block.  No ST segment or T wave changes concerning for acute ischemia.    Chest x-ray: No infiltrates, pneumothorax, nodules, or other concerning findings.  Await formal radiology read.

## 2021-03-04 NOTE — TELEPHONE ENCOUNTER
Patient was transferred to [facility].   [facility] to . Patient is seeing you today at 4:20PM.     Patient states that he has been having intermittent episodes lasting 5-10 minutes of mild chest pain. The area he describes is around his heart. He gets these episodes multiple times per day. He will have an episode it will last 5-10 min then go away for 4 hours. He rates the pain 1-2/10 when it occurs. Patient feels it could be related to his acid reflux, stress, and he initially thought it was his thyroid. He states he has a fitbit and describes heartbeat tachycardic for him reading at 80. He normal/baseline HR is in the mid -high 60s. During conversation patient denies dizziness, n/v, seating, fever, breathing, or cough. He has no past history of heart or lung health concerns. He had prevoious EKG/Stress test completed in 2016 and then again in 2018. Documentation is in the chart under the cardiology tab.     PLAN:   Scheduled with  this afternoon. This nurse discussed when the patient should go to the emergency room. If he develops a intense chest pain, difficult breathing, dizziness, chest pressure, n/v, fever, coughing, or sweating. Patient agreed with plan.     Liliane Crain RN, BSN  [place]  March 4, 2021      Additional Information    Negative: Severe difficulty breathing (e.g., struggling for each breath, speaks in single words)    Negative: Passed out (i.e., fainted, collapsed and was not responding)    Negative: Chest pain lasting longer than 5 minutes and ANY of the following:* Over 50 years old* Over 30 years old and at least one cardiac risk factor (i.e., high blood pressure, diabetes, high cholesterol, obesity, smoker or strong family history of heart disease)* Pain is crushing, pressure-like, or heavy * Took nitroglycerin and chest pain was not relieved* History of heart disease (i.e., angina, heart attack, bypass surgery, angioplasty, CHF)    Negative: Visible sweat  on face or sweat dripping down face    Negative: Sounds like a life-threatening emergency to the triager    Negative: Followed an injury to chest    Negative: SEVERE chest pain    Negative: Pain also present in shoulder(s) or arm(s) or jaw    Negative: Difficulty breathing    Negative: Cocaine use within last 3 days    Negative: History of prior 'blood clot' in leg or lungs (i.e., deep vein thrombosis, pulmonary embolism)    Negative: Recent illness requiring prolonged bed rest (i.e., immobilization)    Negative: Hip or leg fracture in past 2 months (e.g, or had cast on leg or ankle)    Negative: Major surgery in the past month    Negative: Recent long-distance travel with prolonged time in car, bus, plane, or train (i.e., within past 2 weeks; 6 or more hours duration)    Negative: Heart beating irregularly or very rapidly    Negative: Chest pain lasting longer than 5 minutes    Negative: Intermittent chest pain and pain has been increasing in severity or frequency    Negative: Dizziness or lightheadedness    Negative: Coughing up blood    Negative: Patient sounds very sick or weak to the triager    Intermittent chest pains persist > 3 days    Protocols used: CHEST PAIN-A-[place]

## 2021-04-02 ENCOUNTER — ANCILLARY PROCEDURE (OUTPATIENT)
Dept: CARDIOLOGY | Facility: CLINIC | Age: 51
End: 2021-04-02
Attending: FAMILY MEDICINE
Payer: COMMERCIAL

## 2021-04-02 DIAGNOSIS — R07.9 LEFT-SIDED CHEST PAIN: ICD-10-CM

## 2021-04-02 PROCEDURE — 93321 DOPPLER ECHO F-UP/LMTD STD: CPT | Performed by: INTERNAL MEDICINE

## 2021-04-02 PROCEDURE — 93350 STRESS TTE ONLY: CPT | Performed by: INTERNAL MEDICINE

## 2021-04-02 PROCEDURE — 93018 CV STRESS TEST I&R ONLY: CPT | Performed by: INTERNAL MEDICINE

## 2021-04-02 PROCEDURE — 93325 DOPPLER ECHO COLOR FLOW MAPG: CPT | Performed by: INTERNAL MEDICINE

## 2021-04-02 PROCEDURE — 93017 CV STRESS TEST TRACING ONLY: CPT | Performed by: INTERNAL MEDICINE

## 2021-04-02 PROCEDURE — 93352 ADMIN ECG CONTRAST AGENT: CPT | Performed by: INTERNAL MEDICINE

## 2021-04-02 PROCEDURE — 93016 CV STRESS TEST SUPVJ ONLY: CPT | Performed by: INTERNAL MEDICINE

## 2021-04-02 RX ADMIN — Medication 5 ML: at 15:49

## 2021-04-08 ENCOUNTER — IMMUNIZATION (OUTPATIENT)
Dept: LAB | Facility: CLINIC | Age: 51
End: 2021-04-08
Payer: COMMERCIAL

## 2021-05-20 DIAGNOSIS — E03.9 HYPOTHYROIDISM, UNSPECIFIED TYPE: ICD-10-CM

## 2021-05-20 NOTE — LETTER
May 25, 2021      Sukhdeep Wolf  [address]  [place] [facility] [address]              Derik Tarango,     We just wanted to let you know that we sent in a refill for your levothyroxine but you are due for a lab only visit before your next refill is due to make sure we dont need any dosing changes       Please give us a call at [phone] or use [employer] to schedule this lab visit       Have a great day.     Your [facility] Team

## 2021-05-21 RX ORDER — LEVOTHYROXINE SODIUM 100 UG/1
100 TABLET ORAL DAILY
Qty: 90 TABLET | Refills: 0 | Status: SHIPPED | OUTPATIENT
Start: 2021-05-21 | End: 2021-10-28

## 2021-05-21 NOTE — TELEPHONE ENCOUNTER
Routing refill request to provider for review/approval because:  Labs out of range:  TSH    MARJORIE CampN, RN  [facility]

## 2021-10-03 ENCOUNTER — HEALTH MAINTENANCE LETTER (OUTPATIENT)
Age: 51
End: 2021-10-03

## 2021-10-24 ENCOUNTER — NURSE TRIAGE (OUTPATIENT)
Dept: FAMILY MEDICINE | Facility: CLINIC | Age: 51
End: 2021-10-24

## 2021-10-24 NOTE — TELEPHONE ENCOUNTER
Reason for call:  Other   Patient called regarding (reason for call): Patient wanted a in person visit with you for cold/flu-like symptoms.  Patient also wanted to get his right rotator cuff looked at but you have no openings until December.  Patient would like a return call from you as soon as possible to see what your recommendations / suggestions are.   Additional comments:     Phone number to reach patient:  Cell number on file:    Telephone Information:   Mobile [phone]       Best Time:  As soon as possible, preferably Monday 10/25/21    Can we leave a detailed message on this number?  Not Applicable    Travel screening: Not Applicable

## 2021-10-25 ENCOUNTER — TELEPHONE (OUTPATIENT)
Dept: FAMILY MEDICINE | Facility: CLINIC | Age: 51
End: 2021-10-25

## 2021-10-25 NOTE — TELEPHONE ENCOUNTER
Routing to PCP,  Do you have suggestions and are you willing to work him in, please advise   Thanks  Elise Workman RT (R)

## 2021-10-25 NOTE — TELEPHONE ENCOUNTER
"Chest discomfort would last 30 seconds or slightly more. Has not felt this since yesterday, only a few times yesterday. Has not felt it yet today.     Chest discomfort did not radiate. \"Feels more like pressure from the congestion in my chest, I have had this before\". Clear mucous. Not currently having the chest discomfort.     Slightly dizzy on Friday \"from being sick\" that is now gone.     No wheezing or trouble breathing.     Only slight cough and feels like he is improving every day.     Patient has visit scheduled for 10/27 for cough/cold and rotator cuff.     Advised if chest pain returns, radiates, SOB, or dizziness with chest pain then he should be seen more emergently. Patient states understanding.     BARRY Bates/[facility]    1. LOCATION: \"Where does it hurt?\"        Chest  2. RADIATION: \"Does the pain go anywhere else?\" (e.g., into neck, jaw, arms, back)      None  3. ONSET: \"When did the chest pain begin?\" (Minutes, hours or days)       Yesterday   4. PATTERN \"Does the pain come and go, or has it been constant since it started?\"  \"Does it get worse with exertion?\"       Only occurred few times yesterday  5. DURATION: \"How long does it last\" (e.g., seconds, minutes, hours)      Few seconds   6. SEVERITY: \"How bad is the pain?\"  (e.g., Scale 1-10; mild, moderate, or severe)     - MILD (1-3): doesn't interfere with normal activities      - MODERATE (4-7): interferes with normal activities or awakens from sleep     - SEVERE (8-10): excruciating pain, unable to do any normal activities        Mild  7. CARDIAC RISK FACTORS: \"Do you have any history of heart problems or risk factors for heart disease?\" (e.g., angina, prior heart attack; diabetes, high blood pressure, high cholesterol, smoker, or strong family history of heart disease)      E-cig otherwise no  8. PULMONARY RISK FACTORS: \"Do you have any history of lung disease?\"  (e.g., blood clots in lung, asthma, emphysema, " "birth control pills)      None  9. CAUSE: \"What do you think is causing the chest pain?\"      Congestion from cold  10. OTHER SYMPTOMS: \"Do you have any other symptoms?\" (e.g., dizziness, nausea, vomiting, sweating, fever, difficulty breathing, cough)        None      Reason for Disposition    Chest pain(s) lasting a few seconds    Additional Information    Negative: Severe difficulty breathing (e.g., struggling for each breath, speaks in single words)    Negative: Passed out (i.e., fainted, collapsed and was not responding)    Negative: Difficult to awaken or acting confused (e.g., disoriented, slurred speech)    Negative: Shock suspected (e.g., cold/pale/clammy skin, too weak to stand, low BP, rapid pulse)    Negative: Chest pain lasting longer than 5 minutes and ANY of the following:* Over 45 years old* Over 30 years old and at least one cardiac risk factor (e.g., diabetes, high blood pressure, high cholesterol, smoker, or strong family history of heart disease)* History of heart disease (i.e., angina, heart attack, heart failure, bypass surgery, takes nitroglycerin)* Pain is crushing, pressure-like, or heavy    Negative: Heart beating < 50 beats per minute OR > 140 beats per minute    Negative: Visible sweat on face or sweat dripping down face    Negative: Sounds like a life-threatening emergency to the triager    Negative: Followed an injury to chest    Negative: SEVERE chest pain    Negative: Pain also in shoulder(s) or arm(s) or jaw    Negative: Difficulty breathing    Negative: Cocaine use within last 3 days    Negative: Major surgery in the past month    Negative: Hip or leg fracture (broken bone) in past month (or had cast on leg or ankle in past month)    Negative: Illness requiring prolonged bedrest in past month (e.g., immobilization, long hospital stay)    Negative: Long-distance travel in past month (e.g., car, bus, train, plane; with trip lasting 6 or more hours)    Negative: History of prior 'blood " clot' in leg or lungs (i.e., deep vein thrombosis, pulmonary embolism)    Negative: History of inherited increased risk of blood clots (e.g., Factor 5 Leiden, Anti-thrombin 3, Protein C or Protein S deficiency, Prothrombin mutation)    Negative: Heart beating irregularly or very rapidly    Negative: Chest pain lasting longer than 5 minutes and occurred in last 3 days (72 hours) (Exception: feels exactly the same as previously diagnosed heartburn and has accompanying sour taste in mouth)    Negative: Chest pain or 'angina' comes and goes and is happening more often (increasing in frequency) or getting worse (increasing in severity) (Exception: chest pains that last only a few seconds)    Negative: Dizziness or lightheadedness    Negative: Coughing up blood    Negative: Patient sounds very sick or weak to the triager    Negative: Cancer treatment in the past two months (or has cancer now)    Negative: Patient says chest pain feels exactly the same as previously diagnosed 'heartburn'  and  describes burning in chest and accompanying sour taste in mouth    Negative: Fever > 100.4 F (38.0 C)    Negative: Chest pain(s) lasting a few seconds persists > 3 days    Negative: Rash in same area as pain (may be described as 'small blisters')    Negative: All other patients with chest pain (Exception: fleeting chest pain lasting a few seconds)    Negative: Patient wants to be seen    Negative: Chest pain(s) lasting a few seconds from coughing    Protocols used: CHEST PAIN-A-OH

## 2021-10-25 NOTE — TELEPHONE ENCOUNTER
Spoke with pt, he will accept the 5:20 this week. Placed on schedule.     Pt is still having a slight cough, some slight chest pain a few times a day. Cough is productive.     Sore throat is getting better.     Routing to triage to chat about the slight chest pain/pressure that happens a few times a day (but not yet today)

## 2021-10-26 NOTE — PROGRESS NOTES
"  Assessment & Plan     Suspected 2019 novel coronavirus infection  Patient with symptoms just have a viral illness.  Awaiting COVID-19 PCR results.  He is nontoxic appearing on exam today.  His lungs are clear.  He is afebrile.  He also has a normal sat 97%.  He has had symptoms for approximately 10 days now at this point.  He will continue to self isolate at this point.  Recheck if worsening or concerns.  - Symptomatic COVID-19 Virus (Coronavirus) by PCR Nose    Chronic right shoulder pain  Patient with chronic right shoulder pain.  Utilizes 20 tramadol over approximately 4 to 6 months.  Last refill was May 2021.  New prescription sent.  - traMADol (ULTRAM) 50 MG tablet; Take 1 tablet (50 mg) by mouth every 6 hours as needed for severe pain    Hypothyroidism, unspecified type  Patient is due for recheck of his labs however he reports he has been out of his levothyroxine 100 mcg tablets and has been using the 88 mcg tablets instead.  Refill given of 100 mcg tablets and recommend recheck in 6 weeks with lab.  - levothyroxine (SYNTHROID/LEVOTHROID) 100 MCG tablet; Take 1 tablet (100 mcg) by mouth daily ++due for labs++             BMI:   Estimated body mass index is 36.48 kg/m  as calculated from the following:    Height as of this encounter: 1.82 m (5' 11.65\").    Weight as of this encounter: 120.8 kg (266 lb 6.4 oz).   Weight management plan: Discussed healthy diet and exercise guidelines        Return in about 6 months (around 4/27/2022).    Lexi Martinez MD  [facility] DENIS Tarango is a 51 year old who presents for the following health issues     HPI     Acute Illness  Acute illness concerns: URI  Onset/Duration: 1 week  Symptoms:  Fever: no  Chills/Sweats: no  Headache (location?): YES- occasional  Sinus Pressure: no  Conjunctivitis:  no  Ear Pain: no  Rhinorrhea: YES  Congestion: YES  Sore Throat: YES - irritation  Cough: YES - little  Wheeze: no  Decreased Appetite: " "YES  Nausea: no  Vomiting: no  Diarrhea: no  Dysuria/Freq.: no  Dysuria or Hematuria: no  Fatigue/Achiness: YES  Sick/Strep Exposure: no  Therapies tried and outcome: Nyquil    Hypothyroidism Follow-up      Since last visit, patient describes the following symptoms: Weight stable, no hair loss, no skin changes, no constipation, no loose stools      Medication Followup of Tramadol    Taking Medication as prescribed: yes    Side Effects:  None    Medication Helping Symptoms:  Yes    Doing well for his shoulder. Will use a prescription of 20 over 4-5 months.        Review of Systems   CONSTITUTIONAL: As above  ENT/MOUTH: As above   RESP: As above  CV: NEGATIVE for chest pain, palpitations or peripheral edema      Objective    /82   Pulse 83   Temp 97.6  F (36.4  C) (Temporal)   Resp 16   Ht 1.82 m (5' 11.65\")   Wt 120.8 kg (266 lb 6.4 oz)   SpO2 97%   BMI 36.48 kg/m    Body mass index is 36.48 kg/m .  Physical Exam   GENERAL: alert and no distress  HENT: ear canals and TM's normal, nose and mouth without ulcers or lesions  NECK: no adenopathy, no asymmetry, masses, or scars and thyroid normal to palpation  RESP: lungs clear to auscultation - no rales, rhonchi or wheezes  CV: regular rate and rhythm, normal S1 S2, no S3 or S4, no murmur, click or rub, no peripheral edema and peripheral pulses strong  MS: no gross musculoskeletal defects noted, no edema    Covid PCR testing pending            "

## 2021-10-27 ENCOUNTER — OFFICE VISIT (OUTPATIENT)
Dept: FAMILY MEDICINE | Facility: CLINIC | Age: 51
End: 2021-10-27
Payer: COMMERCIAL

## 2021-10-27 VITALS
WEIGHT: 266.4 LBS | SYSTOLIC BLOOD PRESSURE: 116 MMHG | OXYGEN SATURATION: 97 % | HEIGHT: 72 IN | DIASTOLIC BLOOD PRESSURE: 82 MMHG | HEART RATE: 83 BPM | BODY MASS INDEX: 36.08 KG/M2 | TEMPERATURE: 97.6 F | RESPIRATION RATE: 16 BRPM

## 2021-10-27 DIAGNOSIS — Z20.822 SUSPECTED 2019 NOVEL CORONAVIRUS INFECTION: Primary | ICD-10-CM

## 2021-10-27 DIAGNOSIS — M25.511 CHRONIC RIGHT SHOULDER PAIN: ICD-10-CM

## 2021-10-27 DIAGNOSIS — G89.29 CHRONIC RIGHT SHOULDER PAIN: ICD-10-CM

## 2021-10-27 DIAGNOSIS — E03.9 HYPOTHYROIDISM, UNSPECIFIED TYPE: ICD-10-CM

## 2021-10-27 DIAGNOSIS — G89.4 CHRONIC PAIN SYNDROME: ICD-10-CM

## 2021-10-27 PROCEDURE — U0005 INFEC AGEN DETEC AMPLI PROBE: HCPCS | Performed by: FAMILY MEDICINE

## 2021-10-27 PROCEDURE — U0003 INFECTIOUS AGENT DETECTION BY NUCLEIC ACID (DNA OR RNA); SEVERE ACUTE RESPIRATORY SYNDROME CORONAVIRUS 2 (SARS-COV-2) (CORONAVIRUS DISEASE [COVID-19]), AMPLIFIED PROBE TECHNIQUE, MAKING USE OF HIGH THROUGHPUT TECHNOLOGIES AS DESCRIBED BY CMS-2020-01-R: HCPCS | Performed by: FAMILY MEDICINE

## 2021-10-27 PROCEDURE — 99214 OFFICE O/P EST MOD 30 MIN: CPT | Performed by: FAMILY MEDICINE

## 2021-10-27 ASSESSMENT — MIFFLIN-ST. JEOR: SCORE: 2095.89

## 2021-10-28 ENCOUNTER — MYC MEDICAL ADVICE (OUTPATIENT)
Dept: FAMILY MEDICINE | Facility: CLINIC | Age: 51
End: 2021-10-28

## 2021-10-28 RX ORDER — TRAMADOL HYDROCHLORIDE 50 MG/1
TABLET ORAL
Qty: 20 TABLET | Refills: 0 | OUTPATIENT
Start: 2021-10-28

## 2021-10-28 RX ORDER — LEVOTHYROXINE SODIUM 100 UG/1
100 TABLET ORAL DAILY
Qty: 90 TABLET | Refills: 0 | Status: SHIPPED | OUTPATIENT
Start: 2021-10-28 | End: 2022-03-04

## 2021-10-28 RX ORDER — TRAMADOL HYDROCHLORIDE 50 MG/1
50 TABLET ORAL EVERY 6 HOURS PRN
Qty: 20 TABLET | Refills: 1 | Status: SHIPPED | OUTPATIENT
Start: 2021-10-28 | End: 2023-01-24

## 2021-10-28 RX ORDER — LEVOTHYROXINE SODIUM 100 UG/1
TABLET ORAL
Qty: 90 TABLET | Refills: 0 | OUTPATIENT
Start: 2021-10-28

## 2021-10-28 NOTE — TELEPHONE ENCOUNTER
Pending Prescriptions:                       Disp   Refills    levothyroxine (SYNTHROID/LEVOTHROID) 100 M*90 tab*0        Sig: TAKE 1 TABLET BY MOUTH ONCE DAILY DUE  FOR  LABS    traMADol (ULTRAM) 50 MG tablet [Pharmacy M*20 tab*0        Sig: TAKE 1 TABLET BY MOUTH EVERY 6 HOURS AS NEEDED FOR           SEVERE PAIN      Routing refill request to provider for review/approval because:  Drug not on the [facility] refill protocol   Labs out of range:  TSH    Elise Gore RN on 10/28/2021 at 9:29 AM

## 2021-10-29 LAB — SARS-COV-2 RNA RESP QL NAA+PROBE: POSITIVE

## 2022-02-09 ENCOUNTER — MYC MEDICAL ADVICE (OUTPATIENT)
Dept: FAMILY MEDICINE | Facility: CLINIC | Age: 52
End: 2022-02-09
Payer: COMMERCIAL

## 2022-02-09 DIAGNOSIS — E03.9 HYPOTHYROIDISM, UNSPECIFIED TYPE: Primary | ICD-10-CM

## 2022-02-10 NOTE — TELEPHONE ENCOUNTER
Routing to provider-    Please place lab orders    BARRY Bates/[facility]  February 10, 2022

## 2022-02-24 ENCOUNTER — LAB (OUTPATIENT)
Dept: LAB | Facility: CLINIC | Age: 52
End: 2022-02-24
Payer: COMMERCIAL

## 2022-02-24 DIAGNOSIS — E03.9 HYPOTHYROIDISM, UNSPECIFIED TYPE: ICD-10-CM

## 2022-02-24 PROCEDURE — 84439 ASSAY OF FREE THYROXINE: CPT

## 2022-02-24 PROCEDURE — 36415 COLL VENOUS BLD VENIPUNCTURE: CPT

## 2022-02-24 PROCEDURE — 84443 ASSAY THYROID STIM HORMONE: CPT

## 2022-02-25 LAB
T4 FREE SERPL-MCNC: 0.88 NG/DL (ref 0.76–1.46)
TSH SERPL DL<=0.005 MIU/L-ACNC: 4.84 MU/L (ref 0.4–4)

## 2022-03-04 ENCOUNTER — IMMUNIZATION (OUTPATIENT)
Dept: FAMILY MEDICINE | Facility: CLINIC | Age: 52
End: 2022-03-04
Payer: COMMERCIAL

## 2022-03-04 DIAGNOSIS — Z23 HIGH PRIORITY FOR 2019-NCOV VACCINE: Primary | ICD-10-CM

## 2022-03-04 PROCEDURE — 91305 COVID-19,PF,PFIZER (12+ YRS): CPT

## 2022-03-04 PROCEDURE — 0054A COVID-19,PF,PFIZER (12+ YRS): CPT

## 2022-03-04 PROCEDURE — 99207 PR NO CHARGE NURSE ONLY: CPT

## 2022-03-20 ENCOUNTER — HEALTH MAINTENANCE LETTER (OUTPATIENT)
Age: 52
End: 2022-03-20

## 2022-06-02 ENCOUNTER — E-VISIT (OUTPATIENT)
Dept: FAMILY MEDICINE | Facility: CLINIC | Age: 52
End: 2022-06-02
Payer: COMMERCIAL

## 2022-06-02 DIAGNOSIS — J02.9 SORE THROAT: Primary | ICD-10-CM

## 2022-06-02 PROCEDURE — 99421 OL DIG E/M SVC 5-10 MIN: CPT | Performed by: FAMILY MEDICINE

## 2022-06-02 NOTE — PATIENT INSTRUCTIONS
Thank you for choosing us for your care. Given your symptoms, I would like you to do a lab-only visit to determine what is causing them.  I have placed the orders.  Please schedule an appointment with the lab right here in [place] [facility], or call [phone].  I will let you know when the results are back and next steps to take.

## 2022-06-03 ENCOUNTER — ALLIED HEALTH/NURSE VISIT (OUTPATIENT)
Dept: FAMILY MEDICINE | Facility: CLINIC | Age: 52
End: 2022-06-03
Payer: COMMERCIAL

## 2022-06-03 DIAGNOSIS — J02.9 SORE THROAT: ICD-10-CM

## 2022-06-03 LAB
DEPRECATED S PYO AG THROAT QL EIA: NEGATIVE
GROUP A STREP BY PCR: NOT DETECTED

## 2022-06-03 PROCEDURE — 87651 STREP A DNA AMP PROBE: CPT

## 2022-06-03 PROCEDURE — 99207 PR NO CHARGE NURSE ONLY: CPT

## 2022-06-03 NOTE — PROGRESS NOTES
Patient placed in room for rst. Patient name/ reviewed. Patient was swabbed and swabs taking to lab for culture. Patient informed will be called with results and free to go.    Marge LAKE CMA

## 2022-06-06 ENCOUNTER — OFFICE VISIT (OUTPATIENT)
Dept: FAMILY MEDICINE | Facility: CLINIC | Age: 52
End: 2022-06-06
Payer: COMMERCIAL

## 2022-06-06 ENCOUNTER — NURSE TRIAGE (OUTPATIENT)
Dept: FAMILY MEDICINE | Facility: CLINIC | Age: 52
End: 2022-06-06
Payer: COMMERCIAL

## 2022-06-06 VITALS
OXYGEN SATURATION: 99 % | WEIGHT: 266 LBS | RESPIRATION RATE: 16 BRPM | HEART RATE: 80 BPM | DIASTOLIC BLOOD PRESSURE: 82 MMHG | BODY MASS INDEX: 36.03 KG/M2 | TEMPERATURE: 97 F | HEIGHT: 72 IN | SYSTOLIC BLOOD PRESSURE: 124 MMHG

## 2022-06-06 DIAGNOSIS — R50.9 FEVER, UNSPECIFIED FEVER CAUSE: Primary | ICD-10-CM

## 2022-06-06 LAB
BASOPHILS # BLD AUTO: 0.1 10E3/UL (ref 0–0.2)
BASOPHILS NFR BLD AUTO: 1 %
EOSINOPHIL # BLD AUTO: 0.3 10E3/UL (ref 0–0.7)
EOSINOPHIL NFR BLD AUTO: 4 %
ERYTHROCYTE [DISTWIDTH] IN BLOOD BY AUTOMATED COUNT: 12.9 % (ref 10–15)
HCT VFR BLD AUTO: 51.4 % (ref 40–53)
HGB BLD-MCNC: 17.2 G/DL (ref 13.3–17.7)
IMM GRANULOCYTES # BLD: 0 10E3/UL
IMM GRANULOCYTES NFR BLD: 0 %
LYMPHOCYTES # BLD AUTO: 2 10E3/UL (ref 0.8–5.3)
LYMPHOCYTES NFR BLD AUTO: 30 %
MCH RBC QN AUTO: 29.1 PG (ref 26.5–33)
MCHC RBC AUTO-ENTMCNC: 33.5 G/DL (ref 31.5–36.5)
MCV RBC AUTO: 87 FL (ref 78–100)
MONOCYTES # BLD AUTO: 0.6 10E3/UL (ref 0–1.3)
MONOCYTES NFR BLD AUTO: 9 %
NEUTROPHILS # BLD AUTO: 3.8 10E3/UL (ref 1.6–8.3)
NEUTROPHILS NFR BLD AUTO: 56 %
PLATELET # BLD AUTO: 149 10E3/UL (ref 150–450)
RBC # BLD AUTO: 5.92 10E6/UL (ref 4.4–5.9)
WBC # BLD AUTO: 6.7 10E3/UL (ref 4–11)

## 2022-06-06 PROCEDURE — U0005 INFEC AGEN DETEC AMPLI PROBE: HCPCS | Performed by: FAMILY MEDICINE

## 2022-06-06 PROCEDURE — 36415 COLL VENOUS BLD VENIPUNCTURE: CPT | Performed by: FAMILY MEDICINE

## 2022-06-06 PROCEDURE — 85025 COMPLETE CBC W/AUTO DIFF WBC: CPT | Performed by: FAMILY MEDICINE

## 2022-06-06 PROCEDURE — U0003 INFECTIOUS AGENT DETECTION BY NUCLEIC ACID (DNA OR RNA); SEVERE ACUTE RESPIRATORY SYNDROME CORONAVIRUS 2 (SARS-COV-2) (CORONAVIRUS DISEASE [COVID-19]), AMPLIFIED PROBE TECHNIQUE, MAKING USE OF HIGH THROUGHPUT TECHNOLOGIES AS DESCRIBED BY CMS-2020-01-R: HCPCS | Performed by: FAMILY MEDICINE

## 2022-06-06 PROCEDURE — 99213 OFFICE O/P EST LOW 20 MIN: CPT | Performed by: FAMILY MEDICINE

## 2022-06-06 NOTE — RESULT ENCOUNTER NOTE
Please inform of results if patient has not viewed in [facility] within 3 business days.    CBC Results - Your cell counts were essentially normal. Your red blood cell count was barely above normal and your platelet count was 1 point below normal. I am not concerned by either of these findings.    Your covid test is pending. Continue with the current plan of care. It is reassuring the your white blood cell counts are normal.    Your chest xray was normal.    Please call the clinic with any questions you may have.     Have a great day,    Dr. Hernandez

## 2022-06-06 NOTE — TELEPHONE ENCOUNTER
Call from patient.   Patient submitted e-visit on Thursday 6/2/22 for sore throat and fever. Had strep test completed on Friday (negative results). Patient still experiencing symptoms of worsening sore throat, increased fatigue, fever, slight cough and slight congestion.     Says he has taken 3 home covid tests and all have been negative. Last covid test was Friday evening.     Per protocol, patient to be seen in clinic for fever lasting > 3 day.     Scheduled patient to be seen in clinic this morning.     Loan AMADORN, RN     Reason for Disposition    Fever present > 3 days (72 hours)    Additional Information    Negative: Severe difficulty breathing (e.g., struggling for each breath, speaks in single words)    Negative: Sounds like a life-threatening emergency to the triager    Negative: Drooling or spitting out saliva (because can't swallow)    Negative: Unable to open mouth completely    Negative: Drinking very little and has signs of dehydration (e.g., no urine > 12 hours, very dry mouth, very lightheaded)    Negative: Patient sounds very sick or weak to the triager    Negative: Difficulty breathing (per caller) but not severe    Negative: Fever > 103 F (39.4 C)    Negative: Refuses to drink anything for > 12 hours    Negative: SEVERE sore throat pain    Negative: Pus on tonsils (back of throat) and swollen neck lymph nodes ('glands')    Negative: Earache also present    Negative: Widespread rash (especially chest and abdomen)    Negative: Diabetes mellitus or weak immune system (e.g., HIV positive, cancer chemo, splenectomy, organ transplant, chronic steroids)    Negative: History of rheumatic fever    Negative: Patient wants to be seen    Protocols used: SORE THROAT-A-OH

## 2022-06-06 NOTE — PROGRESS NOTES
Assessment & Plan   1. Fever, unspecified fever cause: Likely viral URI; however, patient does have ongoing intermittent recurrent fever.  Recommend continued work from home until fever free for 24 hours without the use of fever reducing medications.  Chest x-ray clear without signs of pneumonia.  CBC shows no elevation white blood cell count.  COVID PCR test pending.  If symptoms are ongoing for longer than 1 week recommend more in-depth evaluation or empiric treatment with antibiotic such as Augmentin or Z-Elbert for sinusitis etc.  - CBC with platelets and differential; Future  - XR Chest 2 Views; Future  - Symptomatic; Yes; 5/31/2022 COVID-19 Virus (Coronavirus) by PCR Nose      Return in about 1 week (around 6/13/2022), or if symptoms worsen or fail to improve.    Abhijit Dubose MD  [facility]    This chart is completed utilizing dictation software; typos and/or incorrect word substitutions may unintentionally occur.    Rito Tarango is a 51 year old who presents for the following health issues:    HPI     Symptoms started Tuesday last week. Had a fever 101F. Associated sore throat and slight cough and occasional headache. Wednesday/Thursday was at its peak.  Has not really improved since that time.    Currently noting fatigue, intermittent fever, and sore throat today. Some body aches.    No rash, diarrhea, nausea, shortness of breath, chest pain.     Working remotely at this time.    He has been trying to rest, but not taking medications.     He has taken 3 home covid tests which were negative. Negative strep test on Friday through our records    Travel to [place] recently.  No out of the country.. No sick contacts known.    Review of Systems   Constitutional, HEENT, lymph, derm, msk, cardiovascular, pulmonary, gi and gu systems are negative, except as otherwise noted.      Objective    /82   Pulse 80   Temp 97  F (36.1  C) (Temporal)   Resp 16   Ht 1.82  "m (5' 11.65\")   Wt 120.7 kg (266 lb)   SpO2 99%   BMI 36.43 kg/m    Body mass index is 36.43 kg/m .  Physical Exam   General: Appears well and in no acute distress.  HEENT: TMs and ear canals normal.  Eyes grossly normal to inspection. Extraocular movements intact. Pupils equal, round, and reactive to light. Mucous membranes moist. No ulcers or lesions noted in the oropharynx.   Heme/Lymph: No supraclavicular, anterior, or posterior cervical lymphadenopathy.   Cardiovascular: Regular rate and rhythm, normal S1 and S2 without murmur. No extra heartsounds or friction rub. Radial pulses present and equal bilaterally.  Respiratory: Lungs clear to auscultation bilaterally. No wheezing or crackles. No prolonged expiration. Symmetrical chest rise.  Musculoskeletal: No gross extremity deformities. No peripheral edema. Normal muscle bulk.    Labs: Pending      CHEST TWO VIEWS 6/6/2022 10:52 AM      HISTORY: Fever, unspecified fever cause.     COMPARISON: 3/4/2021.     FINDINGS: Prominent heart size similar to prior. Pulmonary vascularity  within normal limits. The lungs are clear. No pneumothorax or pleural  effusion.                                                                       IMPRESSION: No radiographic evidence of acute chest abnormality.      "

## 2022-06-06 NOTE — LETTER
RETURN TO WORK/SCHOOL FORM    6/6/2022    Re: Sukhdeep Wolf  1970      To Whom It May Concern:     Sukhdeep Wolf was seen in clinic today because of illness.  He may return to work without restrictions after he is fever free (<100.4F) for 24 hours without fever reducing medications.     He can continue to work in a work-from-home capacity.         Abhijit Dubose MD  6/6/2022 11:06 AM

## 2022-06-07 LAB — SARS-COV-2 RNA RESP QL NAA+PROBE: NEGATIVE

## 2022-06-08 NOTE — RESULT ENCOUNTER NOTE
Please inform of results if patient has not viewed in [facility] within 3 business days.    Your COVID PCR test was normal.    Please call the clinic with any questions you may have.     Have a great day,    Dr. Hernandez None

## 2022-09-04 ENCOUNTER — HEALTH MAINTENANCE LETTER (OUTPATIENT)
Age: 52
End: 2022-09-04

## 2023-01-05 ENCOUNTER — TELEPHONE (OUTPATIENT)
Dept: FAMILY MEDICINE | Facility: CLINIC | Age: 53
End: 2023-01-05

## 2023-01-05 DIAGNOSIS — E03.9 HYPOTHYROIDISM, UNSPECIFIED TYPE: ICD-10-CM

## 2023-01-05 RX ORDER — LEVOTHYROXINE SODIUM 100 UG/1
100 TABLET ORAL DAILY
Qty: 90 TABLET | Refills: 0 | Status: SHIPPED | OUTPATIENT
Start: 2023-01-05 | End: 2023-01-24

## 2023-01-24 ENCOUNTER — OFFICE VISIT (OUTPATIENT)
Dept: FAMILY MEDICINE | Facility: CLINIC | Age: 53
End: 2023-01-24
Payer: COMMERCIAL

## 2023-01-24 VITALS
BODY MASS INDEX: 36.85 KG/M2 | OXYGEN SATURATION: 98 % | WEIGHT: 272.1 LBS | SYSTOLIC BLOOD PRESSURE: 132 MMHG | HEIGHT: 72 IN | DIASTOLIC BLOOD PRESSURE: 83 MMHG | HEART RATE: 84 BPM | TEMPERATURE: 98.1 F

## 2023-01-24 DIAGNOSIS — Z13.220 LIPID SCREENING: ICD-10-CM

## 2023-01-24 DIAGNOSIS — Z79.899 ENCOUNTER FOR LONG-TERM (CURRENT) USE OF MEDICATIONS: ICD-10-CM
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
## 2023-01-24 DIAGNOSIS — G89.4 CHRONIC PAIN SYNDROME: ICD-10-CM

## 2023-01-24 DIAGNOSIS — E03.9 HYPOTHYROIDISM, UNSPECIFIED TYPE: ICD-10-CM

## 2023-01-24 DIAGNOSIS — S29.012D OVERUSE SYNDROME OF MID BACK, SUBSEQUENT ENCOUNTER: ICD-10-CM

## 2023-01-24 DIAGNOSIS — Z00.00 ROUTINE GENERAL MEDICAL EXAMINATION AT A HEALTH CARE FACILITY: Primary | ICD-10-CM

## 2023-01-24 LAB
ALBUMIN SERPL-MCNC: 3.9 G/DL (ref 3.4–5)
ALP SERPL-CCNC: 63 U/L (ref 40–150)
ALT SERPL W P-5'-P-CCNC: 60 U/L (ref 0–70)
ANION GAP SERPL CALCULATED.3IONS-SCNC: 4 MMOL/L (ref 3–14)
AST SERPL W P-5'-P-CCNC: 23 U/L (ref 0–45)
BILIRUB SERPL-MCNC: 0.8 MG/DL (ref 0.2–1.3)
BUN SERPL-MCNC: 16 MG/DL (ref 7–30)
CALCIUM SERPL-MCNC: 9 MG/DL (ref 8.5–10.1)
CHLORIDE BLD-SCNC: 105 MMOL/L (ref 94–109)
CHOLEST SERPL-MCNC: 170 MG/DL
CO2 SERPL-SCNC: 30 MMOL/L (ref 20–32)
CREAT SERPL-MCNC: 1.22 MG/DL (ref 0.66–1.25)
FASTING STATUS PATIENT QL REPORTED: ABNORMAL
GFR SERPL CREATININE-BSD FRML MDRD: 71 ML/MIN/1.73M2
GLUCOSE BLD-MCNC: 93 MG/DL (ref 70–99)
HDLC SERPL-MCNC: 37 MG/DL
LDLC SERPL CALC-MCNC: 89 MG/DL
NONHDLC SERPL-MCNC: 133 MG/DL
POTASSIUM BLD-SCNC: 4.2 MMOL/L (ref 3.4–5.3)
PROT SERPL-MCNC: 7.3 G/DL (ref 6.8–8.8)
SODIUM SERPL-SCNC: 139 MMOL/L (ref 133–144)
T4 FREE SERPL-MCNC: 0.85 NG/DL (ref 0.76–1.46)
TRIGL SERPL-MCNC: 219 MG/DL
TSH SERPL DL<=0.005 MIU/L-ACNC: 8.2 MU/L (ref 0.4–4)

## 2023-01-24 PROCEDURE — 80061 LIPID PANEL: CPT | Performed by: FAMILY MEDICINE

## 2023-01-24 PROCEDURE — 99214 OFFICE O/P EST MOD 30 MIN: CPT | Mod: 25 | Performed by: FAMILY MEDICINE

## 2023-01-24 PROCEDURE — 84439 ASSAY OF FREE THYROXINE: CPT | Performed by: FAMILY MEDICINE

## 2023-01-24 PROCEDURE — 99396 PREV VISIT EST AGE 40-64: CPT | Performed by: FAMILY MEDICINE

## 2023-01-24 PROCEDURE — 80053 COMPREHEN METABOLIC PANEL: CPT | Performed by: FAMILY MEDICINE

## 2023-01-24 PROCEDURE — 36415 COLL VENOUS BLD VENIPUNCTURE: CPT | Performed by: FAMILY MEDICINE

## 2023-01-24 PROCEDURE — 84443 ASSAY THYROID STIM HORMONE: CPT | Performed by: FAMILY MEDICINE

## 2023-01-24 RX ORDER — LEVOTHYROXINE SODIUM 100 UG/1
100 TABLET ORAL DAILY
Qty: 90 TABLET | Refills: 3 | Status: SHIPPED | OUTPATIENT
Start: 2023-01-24 | End: 2024-05-03

## 2023-01-24 RX ORDER — DICLOFENAC SODIUM 75 MG/1
75 TABLET, DELAYED RELEASE ORAL 2 TIMES DAILY
Qty: 60 TABLET | Refills: 1 | Status: SHIPPED | OUTPATIENT
Start: 2023-01-24 | End: 2024-05-03

## 2023-01-24 RX ORDER — TRAMADOL HYDROCHLORIDE 50 MG/1
50 TABLET ORAL EVERY 6 HOURS PRN
Qty: 30 TABLET | Refills: 1 | Status: SHIPPED | OUTPATIENT
Start: 2023-01-24 | End: 2023-05-01

## 2023-01-24 RX ORDER — LEVOTHYROXINE SODIUM 100 UG/1
100 TABLET ORAL DAILY
Qty: 90 TABLET | Refills: 0 | Status: SHIPPED | OUTPATIENT
Start: 2023-01-24 | End: 2023-01-24

## 2023-01-24 RX ORDER — CYCLOBENZAPRINE HCL 10 MG
10 TABLET ORAL EVERY 12 HOURS PRN
Qty: 30 TABLET | Refills: 0 | Status: SHIPPED | OUTPATIENT
Start: 2023-01-24 | End: 2024-05-03

## 2023-01-24 ASSESSMENT — ENCOUNTER SYMPTOMS
EYE PAIN: 0
DIARRHEA: 0
SORE THROAT: 0
CHILLS: 0
HEMATURIA: 0
PARESTHESIAS: 0
HEADACHES: 0
CONSTIPATION: 0
DYSURIA: 0
FREQUENCY: 0
SHORTNESS OF BREATH: 0
MYALGIAS: 1
ARTHRALGIAS: 0
DIZZINESS: 0
JOINT SWELLING: 0
HEARTBURN: 1
NERVOUS/ANXIOUS: 0
COUGH: 0
FEVER: 0
ABDOMINAL PAIN: 0
NAUSEA: 0
HEMATOCHEZIA: 0
PALPITATIONS: 0
WEAKNESS: 0

## 2023-01-24 ASSESSMENT — PAIN SCALES - GENERAL: PAINLEVEL: NO PAIN (0)

## 2023-01-24 NOTE — PROGRESS NOTES
SUBJECTIVE:   CC: Sukhdeep is an 52 year old who presents for preventative health visit.     Patient has been advised of split billing requirements and indicates understanding: No  Healthy Habits:     Getting at least 3 servings of Calcium per day:  Yes    Bi-annual eye exam:  Yes    Dental care twice a year:  Yes    Sleep apnea or symptoms of sleep apnea:  Daytime drowsiness, Excessive snoring and Sleep apnea    Diet:  Regular (no restrictions)    Frequency of exercise:  1 day/week    Duration of exercise:  N/A    Taking medications regularly:  No    Barriers to taking medications:  Problems remembering to take them    Medication side effects:  None    PHQ-2 Total Score: 0    Additional concerns today:  No          BACK PAIN - patient has noted some worsening as he has been doing kitchen remodel. Voltaren occasionally. Tramadol occasionally. Last Tramadol refilled over a year ago.  Patient was given 20 tablets with 1 refill at that time.  He is asking if he can have a few additional as he has been doing a lot of renovation work in his home.        Hypothyroidism Follow-up      Since last visit, patient describes the following symptoms: Weight stable, no hair loss, no skin changes, no constipation, no loose stools      Today's PHQ-2 Score:   PHQ-2 ( 1999 Pfizer) 1/24/2023   Q1: Little interest or pleasure in doing things 0   Q2: Feeling down, depressed or hopeless 0   PHQ-2 Score 0   PHQ-2 Total Score (12-17 Years)- Positive if 3 or more points; Administer PHQ-A if positive -   Q1: Little interest or pleasure in doing things Not at all   Q2: Feeling down, depressed or hopeless Not at all   PHQ-2 Score 0       Have you ever done Advance Care Planning? (For example, a Health Directive, POLST, or a discussion with a medical provider or your loved ones about your wishes): No, advance care planning information given to patient to review.  Patient declined advance care planning discussion at this time.    Social History      Tobacco Use     Smoking status: Former     Packs/day: 0.50     Years: 27.00     Pack years: 13.50     Types: Other, Cigarettes     Quit date: 2013     Years since quittin.8     Smokeless tobacco: Never     Tobacco comments:     E-cig user everyday -- Off cigarettes   Substance Use Topics     Alcohol use: Yes     Alcohol/week: 1.7 standard drinks     Types: 2 Standard drinks or equivalent per week     Comment: 1-2 drinks a week         Alcohol Use 2023   Prescreen: >3 drinks/day or >7 drinks/week? No   Prescreen: >3 drinks/day or >7 drinks/week? -       Last PSA: No results found for: PSA    Reviewed orders with patient. Reviewed health maintenance and updated orders accordingly - Yes  BP Readings from Last 3 Encounters:   23 132/83   22 124/82   10/27/21 116/82    Wt Readings from Last 3 Encounters:   23 123.4 kg (272 lb 1.6 oz)   22 120.7 kg (266 lb)   10/27/21 120.8 kg (266 lb 6.4 oz)                    Reviewed and updated as needed this visit by clinical staff   Tobacco  Allergies  Meds              Reviewed and updated as needed this visit by Provider                     Review of Systems   Constitutional: Negative for chills and fever.   HENT: Positive for ear pain and hearing loss. Negative for congestion and sore throat.    Eyes: Negative for pain and visual disturbance.   Respiratory: Negative for cough and shortness of breath.    Cardiovascular: Negative for chest pain, palpitations and peripheral edema.   Gastrointestinal: Positive for heartburn. Negative for abdominal pain, constipation, diarrhea, hematochezia and nausea.   Genitourinary: Negative for dysuria, frequency, genital sores, hematuria, impotence, penile discharge and urgency.   Musculoskeletal: Positive for myalgias. Negative for arthralgias and joint swelling.   Skin: Negative for rash.   Neurological: Negative for dizziness, weakness, headaches and paresthesias.   Psychiatric/Behavioral: Negative  "for mood changes. The patient is not nervous/anxious.          OBJECTIVE:   /83 (BP Location: Left arm, Patient Position: Sitting, Cuff Size: Adult Large)   Pulse 84   Temp 98.1  F (36.7  C) (Temporal)   Ht 1.82 m (5' 11.65\")   Wt 123.4 kg (272 lb 1.6 oz)   SpO2 98%   BMI 37.26 kg/m      Physical Exam  GENERAL: healthy, alert and no distress  EYES: Eyes grossly normal to inspection, PERRL and conjunctivae and sclerae normal  HENT: ear canals and TM's normal, nose and mouth without ulcers or lesions  NECK: no adenopathy, no asymmetry, masses, or scars and thyroid normal to palpation  RESP: lungs clear to auscultation - no rales, rhonchi or wheezes  CV: regular rate and rhythm, normal S1 S2, no S3 or S4, no murmur, click or rub, no peripheral edema and peripheral pulses strong  ABDOMEN: soft, nontender, no hepatosplenomegaly, no masses and bowel sounds normal  MS: no gross musculoskeletal defects noted, no edema  SKIN: no suspicious lesions or rashes  NEURO: Normal strength and tone, mentation intact and speech normal  PSYCH: mentation appears normal, affect normal/bright    Diagnostic Test Results:  Labs reviewed in Epic    ASSESSMENT/PLAN:   (Z00.00) Routine general medical examination at a health care facility  (primary encounter diagnosis)  Comment: See counseling messages  Plan: Recheck in 1 year    (S29.012D,  X50.3XXD) Overuse syndrome of mid back, subsequent encounter  Comment: Patient with history of back pain that comes and goes.  He utilizes tramadol when needed and when not relieved with anti-inflammatory medication over-the-counter.  He generally uses a total of 40 tablets through the year.  He is asking for a slightly increase in prescription amount as he has been doing a lot of renovation work in his home.  Will give a prescription for the tramadol 30 tablets with 1 refill.  Did discuss with patient that I expect this to last until his next physical next year.  Refill of Flexeril given as " "well to use when needed.  He reports limited use as it does make him sleepy.  Plan: cyclobenzaprine (FLEXERIL) 10 MG tablet,         traMADol (ULTRAM) 50 MG tablet            (G89.4) Chronic pain syndrome  Comment: Patient utilizes diclofenac on occasion.  Refill given.  See above.  Plan: diclofenac (VOLTAREN) 75 MG EC tablet            (E03.9) Hypothyroidism, unspecified type  Comment: Awaiting results. Dose adjustment as needed.    Plan: TSH with free T4 reflex, levothyroxine         (SYNTHROID/LEVOTHROID) 100 MCG tablet, T4 free,        DISCONTINUED: levothyroxine         (SYNTHROID/LEVOTHROID) 100 MCG tablet            (Z79.899) Encounter for long-term (current) use of medications  Comment: We will notify results  Plan: Comprehensive metabolic panel (BMP + Alb, Alk         Phos, ALT, AST, Total. Bili, TP)            (Z13.220) Lipid screening  Comment: We will notify of results  Plan: Lipid panel reflex to direct LDL Non-fasting              Patient has been advised of split billing requirements and indicates understanding: Yes      COUNSELING:   Reviewed preventive health counseling, as reflected in patient instructions       Regular exercise       Healthy diet/nutrition      BMI:   Estimated body mass index is 37.26 kg/m  as calculated from the following:    Height as of this encounter: 1.82 m (5' 11.65\").    Weight as of this encounter: 123.4 kg (272 lb 1.6 oz).   Weight management plan: Discussed healthy diet and exercise guidelines      He reports that he quit smoking about 9 years ago. His smoking use included other and cigarettes. He has a 13.50 pack-year smoking history. He has never used smokeless tobacco.            Lexi Martinez MD  [facility] DENIS  "

## 2023-04-27 ENCOUNTER — MYC MEDICAL ADVICE (OUTPATIENT)
Dept: FAMILY MEDICINE | Facility: CLINIC | Age: 53
End: 2023-04-27
Payer: COMMERCIAL
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
## 2023-04-27 DIAGNOSIS — S29.012D OVERUSE SYNDROME OF MID BACK, SUBSEQUENT ENCOUNTER: ICD-10-CM

## 2023-04-27 NOTE — TELEPHONE ENCOUNTER
Pended prescription and sent to prescriber for review. RN not able to sign per protocol- controlled substance.     MARJORIE LuoN, RN  [facility] ~ Registered Nurse  Clinic Triage ~ [place]  April 27, 2023

## 2023-04-28 ENCOUNTER — MYC MEDICAL ADVICE (OUTPATIENT)
Dept: FAMILY MEDICINE | Facility: OTHER | Age: 53
End: 2023-04-28
Payer: COMMERCIAL

## 2023-04-28 DIAGNOSIS — S29.012D OVERUSE SYNDROME OF MID BACK, SUBSEQUENT ENCOUNTER: ICD-10-CM
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
## 2023-04-28 RX ORDER — TRAMADOL HYDROCHLORIDE 50 MG/1
50 TABLET ORAL EVERY 6 HOURS PRN
Qty: 28 TABLET | Refills: 0 | OUTPATIENT
Start: 2023-04-28

## 2023-04-28 NOTE — TELEPHONE ENCOUNTER
Reviewed notes from physical in January. I have noted that discussed he would have a prescription for tramadol #30 with one refill. Expected to last until next physical January 2024.     Please reach out to patient. Declined refill at this time.

## 2023-05-01 RX ORDER — TRAMADOL HYDROCHLORIDE 50 MG/1
50 TABLET ORAL EVERY 6 HOURS PRN
Qty: 30 TABLET | Refills: 0 | Status: SHIPPED | OUTPATIENT
Start: 2023-05-01 | End: 2024-03-20

## 2024-01-04 ENCOUNTER — PATIENT OUTREACH (OUTPATIENT)
Dept: CARE COORDINATION | Facility: CLINIC | Age: 54
End: 2024-01-04
Payer: COMMERCIAL

## 2024-01-18 ENCOUNTER — PATIENT OUTREACH (OUTPATIENT)
Dept: CARE COORDINATION | Facility: CLINIC | Age: 54
End: 2024-01-18
Payer: COMMERCIAL

## 2024-03-20 ENCOUNTER — OFFICE VISIT (OUTPATIENT)
Dept: FAMILY MEDICINE | Facility: CLINIC | Age: 54
End: 2024-03-20
Attending: FAMILY MEDICINE
Payer: COMMERCIAL

## 2024-03-20 VITALS
BODY MASS INDEX: 36.68 KG/M2 | HEIGHT: 72 IN | OXYGEN SATURATION: 98 % | WEIGHT: 270.8 LBS | SYSTOLIC BLOOD PRESSURE: 130 MMHG | RESPIRATION RATE: 17 BRPM | DIASTOLIC BLOOD PRESSURE: 88 MMHG | HEART RATE: 90 BPM | TEMPERATURE: 98.4 F

## 2024-03-20 DIAGNOSIS — M79.644 PAIN OF RIGHT THUMB: ICD-10-CM

## 2024-03-20 DIAGNOSIS — Z00.00 ROUTINE GENERAL MEDICAL EXAMINATION AT A HEALTH CARE FACILITY: Primary | ICD-10-CM

## 2024-03-20 DIAGNOSIS — Z13.6 CARDIOVASCULAR SCREENING; LDL GOAL LESS THAN 160: ICD-10-CM

## 2024-03-20 DIAGNOSIS — S29.012D OVERUSE SYNDROME OF MID BACK, SUBSEQUENT ENCOUNTER: ICD-10-CM

## 2024-03-20 DIAGNOSIS — E06.3 CHRONIC AUTOIMMUNE THYROIDITIS: ICD-10-CM

## 2024-03-20 DIAGNOSIS — Z13.1 SCREENING FOR DIABETES MELLITUS: ICD-10-CM
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
## 2024-03-20 PROCEDURE — 90471 IMMUNIZATION ADMIN: CPT | Performed by: FAMILY MEDICINE

## 2024-03-20 PROCEDURE — 36415 COLL VENOUS BLD VENIPUNCTURE: CPT | Performed by: FAMILY MEDICINE

## 2024-03-20 PROCEDURE — 99214 OFFICE O/P EST MOD 30 MIN: CPT | Mod: 25 | Performed by: FAMILY MEDICINE

## 2024-03-20 PROCEDURE — 84443 ASSAY THYROID STIM HORMONE: CPT | Performed by: FAMILY MEDICINE

## 2024-03-20 PROCEDURE — 80053 COMPREHEN METABOLIC PANEL: CPT | Performed by: FAMILY MEDICINE

## 2024-03-20 PROCEDURE — 99396 PREV VISIT EST AGE 40-64: CPT | Mod: 25 | Performed by: FAMILY MEDICINE

## 2024-03-20 PROCEDURE — 80061 LIPID PANEL: CPT | Performed by: FAMILY MEDICINE

## 2024-03-20 PROCEDURE — 90715 TDAP VACCINE 7 YRS/> IM: CPT | Performed by: FAMILY MEDICINE

## 2024-03-20 PROCEDURE — 84439 ASSAY OF FREE THYROXINE: CPT | Performed by: FAMILY MEDICINE

## 2024-03-20 RX ORDER — TRAMADOL HYDROCHLORIDE 50 MG/1
50 TABLET ORAL EVERY 6 HOURS PRN
Qty: 30 TABLET | Refills: 0 | Status: SHIPPED | OUTPATIENT
Start: 2024-03-20

## 2024-03-20 SDOH — HEALTH STABILITY: PHYSICAL HEALTH: ON AVERAGE, HOW MANY DAYS PER WEEK DO YOU ENGAGE IN MODERATE TO STRENUOUS EXERCISE (LIKE A BRISK WALK)?: 0 DAYS

## 2024-03-20 SDOH — HEALTH STABILITY: PHYSICAL HEALTH: ON AVERAGE, HOW MANY MINUTES DO YOU ENGAGE IN EXERCISE AT THIS LEVEL?: 0 MIN

## 2024-03-20 ASSESSMENT — ANXIETY QUESTIONNAIRES
6. BECOMING EASILY ANNOYED OR IRRITABLE: NOT AT ALL
1. FEELING NERVOUS, ANXIOUS, OR ON EDGE: NOT AT ALL
7. FEELING AFRAID AS IF SOMETHING AWFUL MIGHT HAPPEN: NOT AT ALL
GAD7 TOTAL SCORE: 0
2. NOT BEING ABLE TO STOP OR CONTROL WORRYING: NOT AT ALL
GAD7 TOTAL SCORE: 0
5. BEING SO RESTLESS THAT IT IS HARD TO SIT STILL: NOT AT ALL
3. WORRYING TOO MUCH ABOUT DIFFERENT THINGS: NOT AT ALL

## 2024-03-20 ASSESSMENT — PATIENT HEALTH QUESTIONNAIRE - PHQ9
5. POOR APPETITE OR OVEREATING: NOT AT ALL
SUM OF ALL RESPONSES TO PHQ QUESTIONS 1-9: 2

## 2024-03-20 ASSESSMENT — SOCIAL DETERMINANTS OF HEALTH (SDOH): HOW OFTEN DO YOU GET TOGETHER WITH FRIENDS OR RELATIVES?: PATIENT DECLINED

## 2024-03-20 ASSESSMENT — PAIN SCALES - GENERAL: PAINLEVEL: NO PAIN (0)

## 2024-03-20 NOTE — PATIENT INSTRUCTIONS
Preventive Care Advice   This is general advice given by our system to help you stay healthy. However, your care team may have specific advice just for you. Please talk to your care team about your preventive care needs.  Nutrition  Eat 5 or more servings of fruits and vegetables each day.  Try wheat bread, brown rice and whole grain pasta (instead of white bread, rice, and pasta).  Get enough calcium and vitamin D. Check the label on foods and aim for 100% of the RDA (recommended daily allowance).  Lifestyle  Exercise at least 150 minutes each week   (30 minutes a day, 5 days a week).  Do muscle strengthening activities 2 days a week. These help control your weight and prevent disease.  No smoking.  Wear sunscreen to prevent skin cancer.  Have a dental exam and cleaning every 6 months.  Yearly exams  See your health care team every year to talk about:  Any changes in your health.  Any medicines your care team has prescribed.  Preventive care, family planning, and ways to prevent chronic diseases.  Shots (vaccines)   HPV shots (up to age 26), if you've never had them before.  Hepatitis B shots (up to age 59), if you've never had them before.  COVID-19 shot: Get this shot when it's due.  Flu shot: Get a flu shot every year.  Tetanus shot: Get a tetanus shot every 10 years.  Pneumococcal, hepatitis A, and RSV shots: Ask your care team if you need these based on your risk.  Shingles shot (for age 50 and up).  General health tests  Diabetes screening:  Starting at age 35, Get screened for diabetes at least every 3 years.  If you are younger than age 35, ask your care team if you should be screened for diabetes.  Cholesterol test: At age 39, start having a cholesterol test every 5 years, or more often if advised.  Bone density scan (DEXA): At age 50, ask your care team if you should have this scan for osteoporosis (brittle bones).  Hepatitis C: Get tested at least once in your life.  STIs (sexually transmitted  infections)  Before age 24: Ask your care team if you should be screened for STIs.  After age 24: Get screened for STIs if you're at risk. You are at risk for STIs (including HIV) if:  You are sexually active with more than one person.  You don't use condoms every time.  You or a partner was diagnosed with a sexually transmitted infection.  If you are at risk for HIV, ask about PrEP medicine to prevent HIV.  Get tested for HIV at least once in your life, whether you are at risk for HIV or not.  Cancer screening tests  Cervical cancer screening: If you have a cervix, begin getting regular cervical cancer screening tests at age 21. Most people who have regular screenings with normal results can stop after age 65. Talk about this with your provider.  Breast cancer scan (mammogram): If you've ever had breasts, begin having regular mammograms starting at age 40. This is a scan to check for breast cancer.  Colon cancer screening: It is important to start screening for colon cancer at age 45.  Have a colonoscopy test every 10 years (or more often if you're at risk) Or, ask your provider about stool tests like a FIT test every year or Cologuard test every 3 years.  To learn more about your testing options, visit: https://www.OnBeep/391808.pdf.  For help making a decision, visit: https://bit.ly/ph15023.  Prostate cancer screening test: If you have a prostate and are age 55 to 69, ask your provider if you would benefit from a yearly prostate cancer screening test.  Lung cancer screening: If you are a current or former smoker age 50 to 80, ask your care team if ongoing lung cancer screenings are right for you.  For informational purposes only. Not to replace the advice of your health care provider. Copyright   2023 [employer]. All rights reserved. Clinically reviewed by the [facility] Transitions Program. Access Intelligence 032531 - REV 01/24.    Substance Use Disorder: Care Instructions  Overview     You can  improve your life and health by stopping your use of alcohol or drugs. When you don't drink or use drugs, you may feel and sleep better. You may get along better with your family, friends, and coworkers. There are medicines and programs that can help with substance use disorder.  How can you care for yourself at home?  Here are some ways to help you stay sober and prevent relapse.  If you have been given medicine to help keep you sober or reduce your cravings, be sure to take it exactly as prescribed.  Talk to your doctor about programs that can help you stop using drugs or drinking alcohol.  Do not keep alcohol or drugs in your home.  Plan ahead. Think about what you'll say if other people ask you to drink or use drugs. Try not to spend time with people who drink or use drugs.  Use the time and money spent on drinking or drugs to do something that's important to you.  Preventing a relapse  Have a plan to deal with relapse. Learn to recognize changes in your thinking that lead you to drink or use drugs. Get help before you start to drink or use drugs again.  Try to stay away from situations, friends, or places that may lead you to drink or use drugs.  If you feel the need to drink alcohol or use drugs again, seek help right away. Call a trusted friend or family member. Some people get support from organizations such as Narcotics Anonymous or [employer] or from treatment facilities.  If you relapse, get help as soon as you can. Some people make a plan with another person that outlines what they want that person to do for them if they relapse. The plan usually includes how to handle the relapse and who to notify in case of relapse.  Don't give up. Remember that a relapse doesn't mean that you have failed. Use the experience to learn the triggers that lead you to drink or use drugs. Then quit again. Recovery is a lifelong process. Many people have several relapses before they are able to quit for good.  Follow-up  "care is a cao part of your treatment and safety. Be sure to make and go to all appointments, and call your doctor if you are having problems. It's also a good idea to know your test results and keep a list of the medicines you take.  When should you call for help?   Call 911  anytime you think you may need emergency care. For example, call if you or someone else:    Has overdosed or has withdrawal signs. Be sure to tell the emergency workers that you are or someone else is using or trying to quit using drugs. Overdose or withdrawal signs may include:  Losing consciousness.  Seizure.  Seeing or hearing things that aren't there (hallucinations).     Is thinking or talking about suicide or harming others.   Where to get help 24 hours a day, 7 days a week   If you or someone you know talks about suicide, self-harm, a mental health crisis, a substance use crisis, or any other kind of emotional distress, get help right away. You can:    Call the Suicide and Crisis Lifeline at 988.     Call 4-940-216-QSSD (1-689.110.1006).     Text HOME to 884250 to access the Crisis Text Line.   Consider saving these numbers in your phone.  Go to [employer] for more information or to chat online.  Call your doctor now or seek immediate medical care if:    You are having withdrawal symptoms. These may include nausea or vomiting, sweating, shakiness, and anxiety.   Watch closely for changes in your health, and be sure to contact your doctor if:    You have a relapse.     You need more help or support to stop.   Where can you learn more?  Go to https://www.healthIssue.net/patiented  Enter H573 in the search box to learn more about \"Substance Use Disorder: Care Instructions.\"  Current as of: November 15, 2023               Content Version: 14.0    1083-2105 [employer]wise, Inc[employer].   Care instructions adapted under license by your healthcare professional. If you have questions about a medical condition or this instruction, always ask " your healthcare professional. Healthwise, Incorporated disclaims any warranty or liability for your use of this information.

## 2024-03-20 NOTE — NURSING NOTE
Prior to immunization administration, verified patients identity using patient s name and date of birth. Please see Immunization Activity for additional information.     Screening Questionnaire for Adult Immunization    Are you sick today?   No   Do you have allergies to medications, food, a vaccine component or latex?   No   Have you ever had a serious reaction after receiving a vaccination?   No   Do you have a long-term health problem with heart, lung, kidney, or metabolic disease (e.g., diabetes), asthma, a blood disorder, no spleen, complement component deficiency, a cochlear implant, or a spinal fluid leak?  Are you on long-term aspirin therapy?   No   Do you have cancer, leukemia, HIV/AIDS, or any other immune system problem?   No   Do you have a parent, brother, or sister with an immune system problem?   No   In the past 3 months, have you taken medications that affect  your immune system, such as prednisone, other steroids, or anticancer drugs; drugs for the treatment of rheumatoid arthritis, Crohn s disease, or psoriasis; or have you had radiation treatments?   No   Have you had a seizure, or a brain or other nervous system problem?   No   During the past year, have you received a transfusion of blood or blood    products, or been given immune (gamma) globulin or antiviral drug?   No   For women: Are you pregnant or is there a chance you could become       pregnant during the next month?   No   Have you received any vaccinations in the past 4 weeks?   No     Immunization questionnaire answers were all negative.      Patient instructed to remain in clinic for 15 minutes afterwards, and to report any adverse reactions.     Screening performed by Lesa Layton on 3/20/2024 at 5:11 PM.

## 2024-03-20 NOTE — PROGRESS NOTES
"Preventive Care Visit  [facility] DENIS Martinez MD, Family Medicine  Mar 20, 2024      Assessment & Plan     Routine general medical examination at a health care facility  See counseling messages    Chronic autoimmune thyroiditis  Awaiting results. Dose adjustment as needed.    - TSH with free T4 reflex; Future  - TSH with free T4 reflex    Overuse syndrome of mid back, subsequent encounter  Patient reports doing overall well.  He uses tramadol rarely or Voltaren rarely.  Today he is not having any pain in his back and exam is normal.  Refill sent.    Pain of right thumb  Patient has pain in his right thumb and has had it for the last couple months slightly improved with use of her brace but continues to be an issue.  He has difficulty with grasping and having pain with grasping.  He does have some weakness with grasp mostly due to the pain in the base of the right thumb.  There is no swelling or redness noted.  Recommend Ortho referral and patient agrees.  - Orthopedic  Referral; Future    CARDIOVASCULAR SCREENING; LDL GOAL LESS THAN 160  Will notify results  - Lipid panel reflex to direct LDL Fasting; Future  - Lipid panel reflex to direct LDL Fasting    Screening for diabetes mellitus  Will notify results  - Comprehensive metabolic panel (BMP + Alb, Alk Phos, ALT, AST, Total. Bili, TP); Future  - Comprehensive metabolic panel (BMP + Alb, Alk Phos, ALT, AST, Total. Bili, TP)              BMI  Estimated body mass index is 36.68 kg/m  as calculated from the following:    Height as of this encounter: 1.83 m (6' 0.05\").    Weight as of this encounter: 122.8 kg (270 lb 12.8 oz).   Weight management plan: Discussed healthy diet and exercise guidelines    Counseling  Appropriate preventive services were discussed with this patient, including applicable screening as appropriate for fall prevention, nutrition, physical activity, Tobacco-use cessation, weight loss and cognition.  " Checklist reviewing preventive services available has been given to the patient.  Reviewed patient's diet, addressing concerns and/or questions.           Rito Tarango is a 53 year old, presenting for the following:  Physical, Recheck Medication, and Thyroid Problem        3/20/2024     4:10 PM   Additional Questions   Roomed by Dianne MARTINEZ   Accompanied by Shanelle         3/20/2024     4:10 PM   Patient Reported Additional Medications   Patient reports taking the following new medications NA        Health Care Directive  Patient does not have a Health Care Directive or Living Will: Discussed advance care planning with patient; however, patient declined at this time.    HPI    RIGHT THUMB ISSUE - notes that he has had trouble with the right thumb. Was using it a lot. Has trouble with extending at the IP joint. Works for a time and then suddenly can't.  For the last 2 months.  Has some discomfort. No injury. Was using more doing work at home. Occasional use of ibuprofen. Sometime the muscle relaxant.     BACK PAIN -upper back.  Off and on.  Will use Tramadol on rare occasion.  Last refill was May 2023. Has used voltaren occasionally.         Hypothyroidism Follow-up    Since last visit, patient describes the following symptoms: Weight stable, no hair loss, no skin changes, no constipation, no loose stools    Pain History:  When did you first notice your pain? January    Have you seen this provider for your pain in the past? No   Where in your body do your have pain? Right thumb   Are you seeing anyone else for your pain? No  What makes your pain better? Immobilize, rest  What makes your pain worse? Overuse   How has pain affected your ability to work? 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 Self                 Chronic Pain - Initial Assessment:    How would you describe your pain?  Upper back  Have you had any recent changes to the severity or  character of your pain?  No  Is there an underlying cause that has been identified?  Musculoskeletal  Has your ability to work or do daily activities changed recently because of your pain?  Occasional instances of discomfort with activity  Which of these pain treatments have you tried? Heat, Rest, and Stretching  Previous medication treatments:                      3/20/2024   General Health   How would you rate your overall physical health? Good         3/20/2024   Nutrition   Three or more servings of calcium each day? Yes   Diet: I don't know   How many servings of fruit and vegetables per day? (!) 0-1   How many sweetened beverages each day? (!) 2         1/24/2023   Exercise   Frequency of exercise: 1 day/week             No data to display                   1/24/2023   Dental   Dentist two times every year? Yes              Today's PHQ-2 Score:       3/20/2024     4:14 PM   PHQ-2 ( 1999 Pfizer)   Q1: Little interest or pleasure in doing things 0   Q2: Feeling down, depressed or hopeless 0   PHQ-2 Score 0   Q1: Little interest or pleasure in doing things Not at all   Q2: Feeling down, depressed or hopeless Not at all   PHQ-2 Score 0         1/24/2023   Substance Use   Alcohol more than 3/day or more than 7/wk No     Social History     Tobacco Use    Smoking status: Former     Packs/day: 0.50     Years: 27.00     Additional pack years: 0.00     Total pack years: 13.50     Types: Other, Cigarettes     Quit date: 4/1/2013     Years since quitting: 10.9    Smokeless tobacco: Never    Tobacco comments:     E-cig user everyday -- Off cigarettes   Vaping Use    Vaping Use: Every day    Substances: Nicotine    Devices: Refillable tank   Substance Use Topics    Alcohol use: Yes     Alcohol/week: 1.7 standard drinks of alcohol     Types: 2 Standard drinks or equivalent per week     Comment: 1-2 drinks a week    Drug use: No       ASCVD Risk   The 10-year ASCVD risk score (Daya PIEDRA, et al., 2019) is: 5.2%     "Values used to calculate the score:      Age: 53 years      Sex: Male      Is Non- : No      Diabetic: No      Tobacco smoker: No      Systolic Blood Pressure: 130 mmHg      Is BP treated: No      HDL Cholesterol: 37 mg/dL      Total Cholesterol: 170 mg/dL           Reviewed and updated as needed this visit by Provider                             Objective    Exam  /88   Pulse 90   Temp 98.4  F (36.9  C) (Temporal)   Resp 17   Ht 1.83 m (6' 0.05\")   Wt 122.8 kg (270 lb 12.8 oz)   SpO2 98%   BMI 36.68 kg/m     Estimated body mass index is 36.68 kg/m  as calculated from the following:    Height as of this encounter: 1.83 m (6' 0.05\").    Weight as of this encounter: 122.8 kg (270 lb 12.8 oz).    Physical Exam    GENERAL: alert and no distress  EYES: Eyes grossly normal to inspection, PERRL and conjunctivae and sclerae normal  HENT: ear canals and TM's normal, nose and mouth without ulcers or lesions  NECK: no adenopathy, no asymmetry, masses, or scars  RESP: CTA bilaterally. No rhonchi, rales or wheezes.    CV: regular rate and rhythm, normal S1 S2, no S3 or S4, no murmur, click or rub, no peripheral edema  ABDOMEN: soft, nontender, no hepatosplenomegaly, no masses and bowel sounds normal  MS: right thumb - patient with minimal tenderness base of thumb, has no erythema or swelling. Good range of motion. Some weakness with .   SKIN: no suspicious lesions or rashes  NEURO: Normal strength and tone, mentation intact and speech normal  PSYCH: mentation appears normal, affect normal/bright      Signed Electronically by: Lexi Martinez MD    "

## 2024-03-20 NOTE — COMMUNITY RESOURCES LIST (ENGLISH)
March 20, 2024           YOUR PERSONALIZED LIST OF SERVICES & PROGRAMS           & RECREATION    Sports      [facility] [place] - Sports Recreation  [address] [place] [address] (Distance: 24.0 miles)  Phone: [phone]  Website: https://www.[facility].gov/acc  Language: English  Fee: Free, Self pay      of the [place] - Sports clubs and recreational activities - [address] [place] [address] (Distance: 9.6 miles)  Language: English  Fee: Self pay, Sliding scale      [employer] - [employer] LE[employer]UE BASEBALL AND SOFTBALL  Website: http://www."[employer] [place]    Classes/Groups      of [facility] Group fitness classes - [address] [place] [address] (Distance: 9.6 miles)  Language: English  Fee: Free, Self pay, Sliding scale      of the [facility] Gym or workout facility - [address] [place] [address] (Distance: 9.6 miles)  Language: English  Fee: Free, Self pay, Sliding scale      [facility] - Care Coordination (Healthcare only)  Phone: [phone]  Website: https://[facility] [employer] Ad[employer].org  Language: English, Khmer  Hours: Wed 9:00 AM - 11:30 AM Thu 1:00 PM - 4:00 PM, 5:30 PM - 7:00 PM               IMPORTANT NUMBERS & WEBSITES        Emergency Services  911  .   [facility]  211 http://211unitedway.org  .   Poison Control  (960) 478-9812 http://[place]poison.org http://wis[place]poison.org  .     Suicide and Crisis Lifeline  988 http://988lifeline.org  .   Childhelp National Child Abuse Hotline  576.231.5675 http://Childhelphotline.org   .   National Sexual Assault Hotline  (977) 969-5289 (HOPE) http://Rainn.org   .     National Runaway Safeline  (169) 812-4008 (RUNAWAY) http://1800runaSierra Design Automation.org  .   Pregnancy & Postpartum Support  Call/text [phone]  [place]: http://CoxHealthupportmn.org  [place]: http://Gozent/wi  .    Substance Abuse National Helpline ([facility])  800-622-HELP (6605) http://Findtreatment.gov   .                DISCLAIMER: [employer] does not endorse any service providers mentioned in this resource list. [employer] does not guarantee that the services mentioned in this resource list will be available to you or will improve your health or wellness.    [facility]

## 2024-03-22 LAB
ALBUMIN SERPL BCG-MCNC: 4.5 G/DL (ref 3.5–5.2)
ALP SERPL-CCNC: 56 U/L (ref 40–150)
ALT SERPL W P-5'-P-CCNC: 42 U/L (ref 0–70)
ANION GAP SERPL CALCULATED.3IONS-SCNC: 12 MMOL/L (ref 7–15)
AST SERPL W P-5'-P-CCNC: 25 U/L (ref 0–45)
BILIRUB SERPL-MCNC: 0.4 MG/DL
BUN SERPL-MCNC: 16 MG/DL (ref 6–20)
CALCIUM SERPL-MCNC: 9.5 MG/DL (ref 8.6–10)
CHLORIDE SERPL-SCNC: 106 MMOL/L (ref 98–107)
CHOLEST SERPL-MCNC: 159 MG/DL
CREAT SERPL-MCNC: 1.07 MG/DL (ref 0.67–1.17)
DEPRECATED HCO3 PLAS-SCNC: 23 MMOL/L (ref 22–29)
EGFRCR SERPLBLD CKD-EPI 2021: 83 ML/MIN/1.73M2
FASTING STATUS PATIENT QL REPORTED: ABNORMAL
GLUCOSE SERPL-MCNC: 86 MG/DL (ref 70–99)
HDLC SERPL-MCNC: 37 MG/DL
LDLC SERPL CALC-MCNC: 102 MG/DL
NONHDLC SERPL-MCNC: 122 MG/DL
POTASSIUM SERPL-SCNC: 4.1 MMOL/L (ref 3.4–5.3)
PROT SERPL-MCNC: 7 G/DL (ref 6.4–8.3)
SODIUM SERPL-SCNC: 141 MMOL/L (ref 135–145)
T4 FREE SERPL-MCNC: 1.07 NG/DL (ref 0.9–1.7)
TRIGL SERPL-MCNC: 101 MG/DL
TSH SERPL DL<=0.005 MIU/L-ACNC: 9.49 UIU/ML (ref 0.3–4.2)

## 2024-03-27 ENCOUNTER — TELEPHONE (OUTPATIENT)
Dept: FAMILY MEDICINE | Facility: CLINIC | Age: 54
End: 2024-03-27
Payer: COMMERCIAL

## 2024-03-27 NOTE — LETTER
March 27, 2024      Sukhdeep Wolf  [address]  [place] [address]              Dear Sukhdeep,    Here are my comments about your recent results:     -HDL(good) cholesterol level is low which can increase your heart disease risk.  A diet high in fat and simple carbohydrates, genetics and being overweight can contribute to this. Your LDL(bad) cholesterol and trigylceride levels are normal.  ADVISE: exercising 150 minutes of aerobic exercise per week (30 minutes 5 days per week or 50 minutes 3 days per week are options), and omega-3 fatty acids (fish oil) 0390-6092 mg daily are helpful to improve this.     -Liver and gallbladder tests are normal (ALT,AST, Alk phos, bilirubin), kidney function is normal (Cr, GFR), sodium is normal, potassium is normal, calcium is normal, glucose is normal.        -TSH (thyroid stimulating hormone) level is elevated but your T4 is normal. Can stay at the same dose.  We can recheck with a lab only visit in 3-4 months if any changes.     For additional lab test information, labtestsonline.org is an excellent reference..     Please let us know if you have any questions or concerns.     Regards,  Lexi Martinez MD

## 2024-03-27 NOTE — TELEPHONE ENCOUNTER
----- Message from Lexi Martinez MD sent at 3/27/2024  1:17 PM CDT -----  Please send the following in a letter to the patient: result notes

## 2024-04-05 ENCOUNTER — OFFICE VISIT (OUTPATIENT)
Dept: ORTHOPEDICS | Facility: CLINIC | Age: 54
End: 2024-04-05
Attending: FAMILY MEDICINE
Payer: COMMERCIAL

## 2024-04-05 ENCOUNTER — ANCILLARY PROCEDURE (OUTPATIENT)
Dept: GENERAL RADIOLOGY | Facility: CLINIC | Age: 54
End: 2024-04-05
Attending: FAMILY MEDICINE
Payer: COMMERCIAL

## 2024-04-05 DIAGNOSIS — M79.644 PAIN OF RIGHT THUMB: ICD-10-CM

## 2024-04-05 DIAGNOSIS — M65.311 TRIGGER THUMB, RIGHT THUMB: Primary | ICD-10-CM

## 2024-04-05 PROCEDURE — 99204 OFFICE O/P NEW MOD 45 MIN: CPT | Performed by: FAMILY MEDICINE

## 2024-04-05 PROCEDURE — 73140 X-RAY EXAM OF FINGER(S): CPT | Mod: RT | Performed by: RADIOLOGY

## 2024-04-05 ASSESSMENT — PAIN SCALES - GENERAL: PAINLEVEL: MODERATE PAIN (5)

## 2024-04-05 NOTE — PROGRESS NOTES
HISTORY OF PRESENT ILLNESS  Mr. Wolf is a 53 year old male who presents to clinic today with pain in his right thumb.  Sukhdeep was laying some tile and renovating his kitchen over the past couple of months, he did notice that his thumb was sticking in flexion with increased use.  He was experiencing symptoms that were ongoing and decided to buy a thumb spica brace.  This has helped quite a bit, now his symptoms will return if he discontinues use of the brace or if he heavily uses his hand.  He points to the palmar aspect of his thumb.        Additional history: as documented    MEDICAL HISTORY  Patient Active Problem List   Diagnosis    CARDIOVASCULAR SCREENING; LDL GOAL LESS THAN 160    Intracranial arachnoid cyst    Lipoma of skin and subcutaneous tissue    Scalp mass    Scalp lesion    Lateral epicondylitis    Overuse syndrome of mid back, subsequent encounter    AC (acromioclavicular) joint arthritis    Chronic pain syndrome    Incomplete tear of right rotator cuff    Chronic autoimmune thyroiditis       Current Outpatient Medications   Medication Sig Dispense Refill    cimetidine (TAGAMET) 200 MG tablet Take 200 mg by mouth as needed      cyclobenzaprine (FLEXERIL) 10 MG tablet Take 1 tablet (10 mg) by mouth every 12 hours as needed for muscle spasms 30 tablet 0    diclofenac (VOLTAREN) 75 MG EC tablet Take 1 tablet (75 mg) by mouth 2 times daily 60 tablet 1    Ginkgo Biloba 40 MG TABS Take 500 mg by mouth daily       levothyroxine (SYNTHROID/LEVOTHROID) 100 MCG tablet Take 1 tablet (100 mcg) by mouth daily 90 tablet 3    LYSINE 1 tablet daily.      Multiple Vitamin (DAILY MULTIVITAMIN PO) Take 1 tablet by mouth daily Reported on 5/12/2017      traMADol (ULTRAM) 50 MG tablet Take 1 tablet (50 mg) by mouth every 6 hours as needed for severe pain 30 tablet 0       No Known Allergies    Family History   Problem Relation Age of Onset    Cerebrovascular Disease Maternal Grandfather     Cardiovascular Paternal  Grandfather     Heart Disease Paternal Grandfather     Diabetes Paternal Grandfather     Myocardial Infarction Father     Asthma No family hx of     C.A.D. No family hx of     Hypertension No family hx of     Breast Cancer No family hx of     Cancer - colorectal No family hx of     Prostate Cancer No family hx of     Cancer No family hx of     Blood Disease No family hx of     Arthritis No family hx of     Alzheimer Disease No family hx of     Circulatory No family hx of     Eye Disorder No family hx of     Gastrointestinal Disease No family hx of     Genitourinary Problems No family hx of     Lipids No family hx of     Musculoskeletal Disorder No family hx of     Respiratory No family hx of     Thyroid Disease No family hx of     Neurologic Disorder No family hx of     Coronary Artery Disease No family hx of     Hyperlipidemia No family hx of     Ovarian Cancer No family hx of     Depression/Anxiety No family hx of     Anesthesia Reaction No family hx of     Thyroid Disease No family hx of     Osteoporosis No family hx of     Chemical Addiction No family hx of     Known Genetic Syndrome No family hx of     Depression No family hx of     Anxiety Disorder No family hx of     Mental Illness No family hx of     Substance Abuse No family hx of     Other Cancer No family hx of     Genetic Disorder No family hx of        Additional medical/Social/Surgical histories reviewed in EPIC and updated as appropriate.        PHYSICAL EXAM  General  - normal appearance, in no obvious distress    Musculoskeletal - right thumb  - inspection: no atrophy, normal joint alignment, no swelling  - palpation: no soft tissue tenderness, no tenderness at the anatomical snuffbox  - strength: 5/5  strength, 5/5 wrist abduction, 5/5 flexion, extension, pronation, supination, adduction  Neuro  - no numbness, no motor deficit, grossly normal coordination, normal muscle tone  Skin  - no ecchymosis, erythema, warmth, or induration, no obvious  rash             ASSESSMENT & PLAN  Mr. Wolf is a 53 year old male who presents to clinic today with pain in his right thumb.    I ordered and independently reviewed an xray of his right thumb, this reveals no obvious acute or chronic issues.    Sukhdeep does have evidence of trigger finger.    We discussed pathophysiology and treatment strategies, this includes hand therapy, diclofenac gel, and watchful waiting.  We also discussed the role of an injection, and surgical management of all else fails.        It was a pleasure seeing Sukhdeep today.    Abraham Roach DO, [facility]  Primary Care Sports Medicine      This note was constructed using Dragon dictation software, please excuse any minor errors in spelling, grammar, or syntax.

## 2024-04-05 NOTE — LETTER
4/5/2024         RE: Sukhdeep Wolf  [address] [address]        Dear Colleague,    Thank you for referring your patient, Sukhdeep Wolf, to the [facility] [place]. Please see a copy of my visit note below.      HISTORY OF PRESENT ILLNESS  Mr. Wolf is a 53 year old male who presents to clinic today with pain in his right thumb.  Sukhdeep was laying some tile and renovating his kitchen over the past couple of months, he did notice that his thumb was sticking in flexion with increased use.  He was experiencing symptoms that were ongoing and decided to buy a thumb spica brace.  This has helped quite a bit, now his symptoms will return if he discontinues use of the brace or if he heavily uses his hand.  He points to the palmar aspect of his thumb.        Additional history: as documented    MEDICAL HISTORY  Patient Active Problem List   Diagnosis     CARDIOVASCULAR SCREENING; LDL GOAL LESS THAN 160     Intracranial arachnoid cyst     Lipoma of skin and subcutaneous tissue     Scalp mass     Scalp lesion     Lateral epicondylitis     Overuse syndrome of mid back, subsequent encounter     AC (acromioclavicular) joint arthritis     Chronic pain syndrome     Incomplete tear of right rotator cuff     Chronic autoimmune thyroiditis       Current Outpatient Medications   Medication Sig Dispense Refill     cimetidine (TAGAMET) 200 MG tablet Take 200 mg by mouth as needed       cyclobenzaprine (FLEXERIL) 10 MG tablet Take 1 tablet (10 mg) by mouth every 12 hours as needed for muscle spasms 30 tablet 0     diclofenac (VOLTAREN) 75 MG EC tablet Take 1 tablet (75 mg) by mouth 2 times daily 60 tablet 1     Ginkgo Biloba 40 MG TABS Take 500 mg by mouth daily        levothyroxine (SYNTHROID/LEVOTHROID) 100 MCG tablet Take 1 tablet (100 mcg) by mouth daily 90 tablet 3     LYSINE 1 tablet daily.       Multiple Vitamin (DAILY MULTIVITAMIN PO) Take 1 tablet by mouth daily Reported  on 5/12/2017       traMADol (ULTRAM) 50 MG tablet Take 1 tablet (50 mg) by mouth every 6 hours as needed for severe pain 30 tablet 0       No Known Allergies    Family History   Problem Relation Age of Onset     Cerebrovascular Disease Maternal Grandfather      Cardiovascular Paternal Grandfather      Heart Disease Paternal Grandfather      Diabetes Paternal Grandfather      Myocardial Infarction Father      Asthma No family hx of      C.A.D. No family hx of      Hypertension No family hx of      Breast Cancer No family hx of      Cancer - colorectal No family hx of      Prostate Cancer No family hx of      Cancer No family hx of      Blood Disease No family hx of      Arthritis No family hx of      Alzheimer Disease No family hx of      Circulatory No family hx of      Eye Disorder No family hx of      Gastrointestinal Disease No family hx of      Genitourinary Problems No family hx of      Lipids No family hx of      Musculoskeletal Disorder No family hx of      Respiratory No family hx of      Thyroid Disease No family hx of      Neurologic Disorder No family hx of      Coronary Artery Disease No family hx of      Hyperlipidemia No family hx of      Ovarian Cancer No family hx of      Depression/Anxiety No family hx of      Anesthesia Reaction No family hx of      Thyroid Disease No family hx of      Osteoporosis No family hx of      Chemical Addiction No family hx of      Known Genetic Syndrome No family hx of      Depression No family hx of      Anxiety Disorder No family hx of      Mental Illness No family hx of      Substance Abuse No family hx of      Other Cancer No family hx of      Genetic Disorder No family hx of        Additional medical/Social/Surgical histories reviewed in EPIC and updated as appropriate.        PHYSICAL EXAM  General  - normal appearance, in no obvious distress    Musculoskeletal - right thumb  - inspection: no atrophy, normal joint alignment, no swelling  - palpation: no soft tissue  tenderness, no tenderness at the anatomical snuffbox  - strength: 5/5  strength, 5/5 wrist abduction, 5/5 flexion, extension, pronation, supination, adduction  Neuro  - no numbness, no motor deficit, grossly normal coordination, normal muscle tone  Skin  - no ecchymosis, erythema, warmth, or induration, no obvious rash             ASSESSMENT & PLAN  Mr. Wolf is a 53 year old male who presents to clinic today with pain in his right thumb.    I ordered and independently reviewed an xray of his right thumb, this reveals no obvious acute or chronic issues.    Sukhdeep does have evidence of trigger finger.    We discussed pathophysiology and treatment strategies, this includes hand therapy, diclofenac gel, and watchful waiting.  We also discussed the role of an injection, and surgical management of all else fails.        It was a pleasure seeing Sukhdeep today.    Abraham Roach DO, [facility]  Primary Care [facility] Medicine      This note was constructed using Dragon dictation software, please excuse any minor errors in spelling, grammar, or syntax.      Again, thank you for allowing me to participate in the care of your patient.        Sincerely,        Abraham Roach DO

## 2024-04-05 NOTE — NURSING NOTE
Chief Complaint   Patient presents with    Right Hand - Pain, New Patient     Thumb pain for 3 months       There were no vitals filed for this visit.    There is no height or weight on file to calculate BMI.      YESSY Miller NREMT

## 2024-04-12 ENCOUNTER — THERAPY VISIT (OUTPATIENT)
Dept: OCCUPATIONAL THERAPY | Facility: CLINIC | Age: 54
End: 2024-04-12
Payer: COMMERCIAL

## 2024-04-12 DIAGNOSIS — M79.644 PAIN OF RIGHT THUMB: Primary | ICD-10-CM

## 2024-04-12 PROCEDURE — 97165 OT EVAL LOW COMPLEX 30 MIN: CPT | Mod: GO

## 2024-04-12 PROCEDURE — 97530 THERAPEUTIC ACTIVITIES: CPT | Mod: GO

## 2024-04-12 NOTE — PROGRESS NOTES
"OCCUPATIONAL THERAPY EVALUATION  Type of Visit: Evaluation    See electronic medical record for Abuse and Falls Screening details.    Subjective      Presenting condition or subjective complaint: Right thumb  Date of onset: 01/01/24    Relevant medical history: Thyroid problems   Past Surgical History:   Procedure Laterality Date    COLONOSCOPY WITH CO2 INSUFFLATION N/A 2/5/2021    Procedure: COLONOSCOPY, WITH CO2 INSUFFLATION;  Surgeon: Lexi Martinez MD;  Location: [facility] OR    EXCISE MASS HEAD  8/19/2013    Procedure: EXCISE MASS HEAD;  Resection Of Right Scalp Mass ;  Surgeon: Anson Pinedo MD;  Location: [facility] OR     Prior diagnostic imaging/testing results: X-ray     Prior therapy history for the same diagnosis, illness or injury: No      Prior Level of Function  Transfers: Independent  Ambulation: Independent  ADL: Independent  IADL:  Independent    Living Environment  No concerns    Patient goals for therapy: Full use of my thumb    Pain assessment: Pain present  See objective evaluation for additional pain details     Objective   ADDITIONAL HISTORY:  Right hand dominant  Patient reports symptoms of pain, edema, and triggering  Transportation: drives  Currently working in normal job without restrictions    Functional Outcome Measure:   Upper Extremity Functional Index Score:  SCORE:   Column Totals: /80: 72   (A lower score indicates greater disability.)    PAIN:  Pain Level at Rest: 0/10  Pain Level with Use: 3/10  Pain Location: R thumb at IP and MP joint and thenars  Pain Quality: Aching, Dull, and Sharp  Pain Frequency: intermittent or \"depending on what I'm doing\"  Pain is Worst: daytime  Pain is Exacerbated By: thumb use, pinching, lifting  Pain is Relieved By: NSAIDs and bracing, Voltaren  Pain Progression: Improved    EDEMA:   Finger/Thumb   (Circumference measured in cm) 4/12/2024   Thumb P1 7.1   IP 7.1     ROM:  Per observation, R thumb ROM WFL across all planes.     SPECIAL TESTS: "   Stage of Stenosing Tenosynovitis (SST) Right   Thumb Stage 2-3   Stage 1:  Normal  Stage 2:  Uneven motion of tendon  Stage 3:  Triggering, clicking, catching  Stage 4:  Locking in extension or flexion; unlocked by active motion  Stage 5:  Locking in extension or flexion; unlocked by passive motion  Stage 6:  Finger locked in extension or flexion    STRENGTH:     Measured in pounds 4/12/2024 4/12/2024    Left Right   Trial 1 124 128     Lateral Pinch  Measured in pounds 4/12/2024 4/12/2024    Left Right   Trial 1 31 26++     3 Point Pinch  Measured in pounds 4/12/2024 4/12/2024    Left Right   Trial 1 22 10++     PALPATION:   Finger Palpation    A1 Pulley 2/10    A3 Pulley/IP Joint 1/10     Assessment & Plan   CLINICAL IMPRESSIONS  Medical Diagnosis: Pain of R Thumb    Treatment Diagnosis: Pain of R Thumb    Impression/Assessment: Pt is a 53 year old male presenting to Occupational Therapy due to Pain of right thumb.  The following significant findings have been identified: Impaired strength, Pain, and tendon gliding .  These identified deficits interfere with their ability to perform self care tasks, work tasks, recreational activities, household chores, and meal planning and preparation as compared to previous level of function.   Patient's limitations or Problem List includes: Pain, Increased edema, Weakness, Adherent scarring, Decreased , Decreased pinch, Tightness in musculature, Adherence in connective tissue, and tendon gliding  of the right thumb which interferes with the patient's ability to perform Self Care Tasks (dressing), Work Tasks, Recreational Activities, and Household Chores as compared to previous level of function.    Clinical Decision Making (Complexity):  Assessment of Occupational Performance: 5 or more Performance Deficits  Occupational Performance Limitations: dressing, home establishment and management, meal preparation and cleanup, work, and leisure activities  Clinical Decision  Making (Complexity): Low complexity    PLAN OF CARE  Treatment Interventions:  Modalities:  US and Paraffin  Therapeutic Exercise:  AROM, AAROM, PROM, Tendon Gliding, Blocking, and Reverse Blocking  Neuromuscular re-education:  Nerve Gliding and Kinesiotaping  Manual Techniques:  Joint mobilization, Scar mobilization, Friction massage, Myofascial release, and Manual edema mobilization  Orthotic Fabrication:  Static and Finger based  Self Care:  Self Care Tasks, Ergonomic Considerations, and Work Tasks    Long Term Goals    Not applicable      Frequency of Treatment: 1x weekly  Duration of Treatment: 1 weeks     Education Assessment: Learner/Method: Patient;Demonstration;Pictures/Video  Education Comments: PTRX via phone     Risks and benefits of evaluation/treatment have been explained.   Patient/Family/caregiver agrees with Plan of Care.     Evaluation Time:    OT Eval, Low Complexity Minutes (26248): 25  Signing Clinician: Trinh Christine OT

## 2024-04-16 PROBLEM — M79.644 PAIN OF RIGHT THUMB: Status: ACTIVE | Noted: 2024-04-16

## 2024-05-03 DIAGNOSIS — E03.9 HYPOTHYROIDISM, UNSPECIFIED TYPE: ICD-10-CM

## 2024-05-03 DIAGNOSIS — S29.012D OVERUSE SYNDROME OF MID BACK, SUBSEQUENT ENCOUNTER: ICD-10-CM

## 2024-05-03 DIAGNOSIS — G89.4 CHRONIC PAIN SYNDROME: ICD-10-CM
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
## 2024-05-03 RX ORDER — LEVOTHYROXINE SODIUM 100 UG/1
100 TABLET ORAL DAILY
Qty: 90 TABLET | Refills: 3 | Status: SHIPPED | OUTPATIENT
Start: 2024-05-03

## 2024-05-03 RX ORDER — DICLOFENAC SODIUM 75 MG/1
75 TABLET, DELAYED RELEASE ORAL 2 TIMES DAILY
Qty: 60 TABLET | Refills: 2 | Status: SHIPPED | OUTPATIENT
Start: 2024-05-03

## 2024-05-03 RX ORDER — CYCLOBENZAPRINE HCL 10 MG
TABLET ORAL
Qty: 30 TABLET | Refills: 1 | Status: SHIPPED | OUTPATIENT
Start: 2024-05-03

## 2024-10-21 ENCOUNTER — TELEPHONE (OUTPATIENT)
Dept: FAMILY MEDICINE | Facility: CLINIC | Age: 54
End: 2024-10-21
Payer: COMMERCIAL

## 2024-10-21 NOTE — TELEPHONE ENCOUNTER
Reason for Call:  Appointment Request    Patient requesting this type of appt:  Hospital/ED Follow-Up     Requested provider: Lexi Martinez    Reason patient unable to be scheduled: Not within requested timeframe    When does patient want to be seen/preferred time: 3-7 days    Comments: hospital visit on 10/19    Could we send this information to you in [facility] or would you prefer to receive a phone call?:   Patient would prefer a phone call   Okay to leave a detailed message?: Yes at Cell number on file:    Telephone Information:   Mobile [phone]       Call taken on 10/21/2024 at 10:22 AM by Julia Khan PTA

## 2024-10-23 ENCOUNTER — OFFICE VISIT (OUTPATIENT)
Dept: FAMILY MEDICINE | Facility: CLINIC | Age: 54
End: 2024-10-23
Payer: COMMERCIAL

## 2024-10-23 VITALS
SYSTOLIC BLOOD PRESSURE: 136 MMHG | OXYGEN SATURATION: 100 % | WEIGHT: 271 LBS | HEIGHT: 72 IN | BODY MASS INDEX: 36.7 KG/M2 | TEMPERATURE: 97.9 F | DIASTOLIC BLOOD PRESSURE: 80 MMHG | HEART RATE: 77 BPM

## 2024-10-23 DIAGNOSIS — Z13.220 LIPID SCREENING: ICD-10-CM

## 2024-10-23 DIAGNOSIS — E06.3 CHRONIC AUTOIMMUNE THYROIDITIS: ICD-10-CM

## 2024-10-23 DIAGNOSIS — Z13.1 SCREENING FOR DIABETES MELLITUS: ICD-10-CM

## 2024-10-23 DIAGNOSIS — R00.2 PALPITATIONS: Primary | ICD-10-CM

## 2024-10-23 PROCEDURE — 99214 OFFICE O/P EST MOD 30 MIN: CPT | Performed by: FAMILY MEDICINE

## 2024-10-23 PROCEDURE — G2211 COMPLEX E/M VISIT ADD ON: HCPCS | Performed by: FAMILY MEDICINE

## 2024-10-23 ASSESSMENT — PAIN SCALES - GENERAL: PAINLEVEL_OUTOF10: NO PAIN (1)

## 2024-10-23 NOTE — PROGRESS NOTES
"  Assessment & Plan     Palpitations  Patient was in the ED due to palpitations.   Felt them. Noted pulse to be in the 80s when he got his watch on.   He hasn't noted significant issues since.   Was in the ED and had normal EKG per report. Negative troponin.   CBC and BMP normal.   He has an order for a Zio patch. It is supposed to come in the mail today.   Recommend also checking TSH. Will check CMP as well.   Minimize caffeine.   Recheck for worsening palpitations, development of chest pain or shortness of breath or other concerns.   - TSH with free T4 reflex; Future  - Comprehensive metabolic panel (BMP + Alb, Alk Phos, ALT, AST, Total. Bili, TP); Future  - Comprehensive metabolic panel; Future    Chronic autoimmune thyroiditis  Last check was normal.   With the palpitations, would recommend recheck.   Will notify of results.   - TSH with free T4 reflex; Future    Lipid screening  Will notify of results.   - Lipid panel reflex to direct LDL Non-fasting; Future  - Lipid panel reflex to direct LDL Fasting; Future    Screening for diabetes mellitus  Will notify of results.   - Hemoglobin A1c; Future  - Hemoglobin A1c; Future    The longitudinal plan of care for the diagnosis(es)/condition(s) as documented were addressed during this visit. Due to the added complexity in care, I will continue to support Sukhdeep in the subsequent management and with ongoing continuity of care.      BMI  Estimated body mass index is 36.7 kg/m  as calculated from the following:    Height as of this encounter: 1.83 m (6' 0.05\").    Weight as of this encounter: 122.9 kg (271 lb).   Weight management plan: Discussed healthy diet and exercise guidelines          Subjective   Sukhdeep is a 54 year old, presenting for the following health issues:  Hospital F/U        10/23/2024     3:16 PM   Additional Questions   Roomed by BT   Accompanied by self     HPI       ED/UC Followup:    Facility:  [facility]   Date of visit: 10/19/24  Reason " "for visit: palpitations - increased heart rate   Current Status: same in severity but less frequently now    Woke up once morning about 7 am feeling that heart was racing.   On watch, was 80s. Sometimes slightly higher.   Felt a pressure about the size tip of the finger.     Has ziopatch being sent to his home.     Marcel's thyroid? Cousin's child.    Manujoann honey. Has been taking this recently.       Normal cbc, cmp and troponin.   No abnormalities on monitoring in the ED.     Has reduced caffeine. One cup per day. This was prior to palpitations.               Objective    /80   Pulse 77   Temp 97.9  F (36.6  C) (Temporal)   Ht 1.83 m (6' 0.05\")   Wt 122.9 kg (271 lb)   SpO2 100%   BMI 36.70 kg/m    Body mass index is 36.7 kg/m .  Physical Exam   GENERAL: alert and no distress  NECK: no adenopathy, no asymmetry, masses, or scars  RESP: lungs clear to auscultation - no rales, rhonchi or wheezes  CV: regular rate and rhythm, normal S1 S2, no S3 or S4, no murmur, click or rub, no peripheral edema  MS: no gross musculoskeletal defects noted, no edema            Signed Electronically by: Lexi Martinez MD    "

## 2024-10-25 ENCOUNTER — LAB (OUTPATIENT)
Dept: LAB | Facility: CLINIC | Age: 54
End: 2024-10-25
Payer: COMMERCIAL

## 2024-10-25 DIAGNOSIS — R00.2 PALPITATIONS: ICD-10-CM

## 2024-10-25 DIAGNOSIS — E06.3 CHRONIC AUTOIMMUNE THYROIDITIS: ICD-10-CM

## 2024-10-25 DIAGNOSIS — Z13.1 SCREENING FOR DIABETES MELLITUS: ICD-10-CM

## 2024-10-25 DIAGNOSIS — Z13.220 LIPID SCREENING: ICD-10-CM

## 2024-10-25 LAB
ALBUMIN SERPL BCG-MCNC: 4.2 G/DL (ref 3.5–5.2)
ALP SERPL-CCNC: 52 U/L (ref 40–150)
ALT SERPL W P-5'-P-CCNC: 44 U/L (ref 0–70)
ANION GAP SERPL CALCULATED.3IONS-SCNC: 10 MMOL/L (ref 7–15)
AST SERPL W P-5'-P-CCNC: 30 U/L (ref 0–45)
BILIRUB SERPL-MCNC: 0.7 MG/DL
BUN SERPL-MCNC: 16.7 MG/DL (ref 6–20)
CALCIUM SERPL-MCNC: 9 MG/DL (ref 8.8–10.4)
CHLORIDE SERPL-SCNC: 105 MMOL/L (ref 98–107)
CHOLEST SERPL-MCNC: 142 MG/DL
CREAT SERPL-MCNC: 1.18 MG/DL (ref 0.67–1.17)
EGFRCR SERPLBLD CKD-EPI 2021: 73 ML/MIN/1.73M2
EST. AVERAGE GLUCOSE BLD GHB EST-MCNC: 114 MG/DL
FASTING STATUS PATIENT QL REPORTED: ABNORMAL
FASTING STATUS PATIENT QL REPORTED: ABNORMAL
GLUCOSE SERPL-MCNC: 109 MG/DL (ref 70–99)
HBA1C MFR BLD: 5.6 % (ref 0–5.6)
HCO3 SERPL-SCNC: 24 MMOL/L (ref 22–29)
HDLC SERPL-MCNC: 37 MG/DL
LDLC SERPL CALC-MCNC: 77 MG/DL
NONHDLC SERPL-MCNC: 105 MG/DL
POTASSIUM SERPL-SCNC: 3.9 MMOL/L (ref 3.4–5.3)
PROT SERPL-MCNC: 6.6 G/DL (ref 6.4–8.3)
SODIUM SERPL-SCNC: 139 MMOL/L (ref 135–145)
TRIGL SERPL-MCNC: 140 MG/DL
TSH SERPL DL<=0.005 MIU/L-ACNC: 4.01 UIU/ML (ref 0.3–4.2)

## 2024-10-25 PROCEDURE — 80061 LIPID PANEL: CPT

## 2024-10-25 PROCEDURE — 83036 HEMOGLOBIN GLYCOSYLATED A1C: CPT

## 2024-10-25 PROCEDURE — 36415 COLL VENOUS BLD VENIPUNCTURE: CPT

## 2024-10-25 PROCEDURE — 84443 ASSAY THYROID STIM HORMONE: CPT

## 2024-10-25 PROCEDURE — 80053 COMPREHEN METABOLIC PANEL: CPT

## 2025-03-19 ENCOUNTER — PATIENT OUTREACH (OUTPATIENT)
Dept: CARE COORDINATION | Facility: CLINIC | Age: 55
End: 2025-03-19
Payer: COMMERCIAL

## 2025-04-02 ENCOUNTER — PATIENT OUTREACH (OUTPATIENT)
Dept: CARE COORDINATION | Facility: CLINIC | Age: 55
End: 2025-04-02
Payer: COMMERCIAL

## 2025-04-20 ENCOUNTER — HEALTH MAINTENANCE LETTER (OUTPATIENT)
Age: 55
End: 2025-04-20

## (undated) DEVICE — SOL WATER IRRIG 1000ML BOTTLE 07139-09

## (undated) DEVICE — PREP CHLORAPREP 26ML TINTED ORANGE  260815

## (undated) RX ORDER — FENTANYL CITRATE 50 UG/ML
INJECTION, SOLUTION INTRAMUSCULAR; INTRAVENOUS
Status: DISPENSED
Start: 2021-02-05